# Patient Record
Sex: MALE | Race: WHITE | NOT HISPANIC OR LATINO | URBAN - METROPOLITAN AREA
[De-identification: names, ages, dates, MRNs, and addresses within clinical notes are randomized per-mention and may not be internally consistent; named-entity substitution may affect disease eponyms.]

---

## 2019-06-18 ENCOUNTER — OFFICE VISIT (OUTPATIENT)
Dept: URGENT CARE | Facility: CLINIC | Age: 34
End: 2019-06-18
Payer: MEDICAID

## 2019-06-18 VITALS
SYSTOLIC BLOOD PRESSURE: 130 MMHG | HEART RATE: 81 BPM | HEIGHT: 67 IN | OXYGEN SATURATION: 99 % | BODY MASS INDEX: 17.27 KG/M2 | DIASTOLIC BLOOD PRESSURE: 82 MMHG | TEMPERATURE: 98 F | RESPIRATION RATE: 18 BRPM | WEIGHT: 110 LBS

## 2019-06-18 DIAGNOSIS — R20.2 LEFT HAND PARESTHESIA: Primary | ICD-10-CM

## 2019-06-18 PROCEDURE — 99203 OFFICE O/P NEW LOW 30 MIN: CPT | Mod: S$GLB,,, | Performed by: FAMILY MEDICINE

## 2019-06-18 PROCEDURE — 99203 PR OFFICE/OUTPT VISIT, NEW, LEVL III, 30-44 MIN: ICD-10-PCS | Mod: S$GLB,,, | Performed by: FAMILY MEDICINE

## 2019-06-18 NOTE — PATIENT INSTRUCTIONS
"  Paraesthesias  Paraesthesia is a burning or prickling sensation that is sometimes felt in the hands, arms, legs or feet. It can also occur in other parts of the body. It can also feel like tingling or numbness, skin crawling, or itching. The feeling is not comfortable, but it is not painful. (The "pins and needles" feeling that happens when a foot or hand "falls asleep" is a temporary paraesthesia.)  Paraesthesias that last or come and go may be caused by medical issues that need to be treated. These include stroke, a bulging disk pressing on a nerve, a trapped nerve, vitamin deficiencies, or even certain medicines.  Tests are often done. These tests may include blood tests, X-ray, CT (computerized tomography) scan, or a muscle test (electromyography). Depending on the cause, treatment may include physical therapy.  Home care  · Tell the healthcare provider about all medicines you take. This includes prescription and over-the-counter medicines, vitamins, and herbs. Ask if any of the medicines may be causing your problems. Do not make any changes to prescription medicines without talking to your healthcare provider first.  · You may be prescribed medicines to help relieve the tingling feeling or for pain. Take all medicines as directed.  · A numb hand or foot may be more prone to injury. To help protect it:  ¨ Always use oven mitts.  ¨ Test water with an unaffected hand or foot.  ¨ Use caution when trimming nails. File sharp areas.  ¨ Wear shoes that fit well to avoid pressure points, blisters, and ulcers.  ¨ Inspect your hands and feet carefully (including the soles of your feet and between your toes) at least once a week. If you see red areas, sores, or other problems, tell your healthcare provider.  Follow-up care  Follow up with your doctor or as advised by our staff. You may need further testing or evaluation.  When to seek medical advice  Call your healthcare provider right away if any of the following " occur:  · Numbness or weakness of the face, one arm, or one leg  · Slurred speech, confusion, trouble speaking, walking, or seeing  · Severe headache, fainting spell, dizziness, or seizure  · Chest, arm, neck, or upper back pain  · Loss of bladder or bowel control  · Open wound with redness, swelling, or pus  Date Last Reviewed: 9/25/2015  © 0308-6788 [employer]. [address] [place] [address], [place] [address]. All rights reserved. This information is not intended as a substitute for professional medical care. Always follow your healthcare professional's instructions.      IF THIS IS NOT IMPROVING, I WANT YOU TO SEE THE HAND DOCTOR.  CALL [phone], IF YOU DO NOT HEAR FROM THEM.    Make sure that you follow up with your primary care doctor in the next 2-5 days if needed .  Return to the Urgent Care if signs or symptoms change and certainly if you have worsening symptoms go to the nearest emergency department for further evaluation.

## 2019-06-18 NOTE — PROGRESS NOTES
"Subjective:       Patient ID: Alexandru Najera is a 34 y.o. male.    Vitals:  height is 5' 7" (1.702 m) and weight is 49.9 kg (110 lb). His oral temperature is 97.8 °F (36.6 °C). His blood pressure is 130/82 and his pulse is 81. His respiration is 18 and oxygen saturation is 99%.     Chief Complaint: Hand Pain    34-year-old male who works as a .  About 1 week ago he noticed a tiny bump on his left little finger, and did not think much of it.  These past several days he has felt a pins and needle sensation along his lateral left pinky, and this same sensation has extended up his lateral arm, at times to his elbow.  Today he has also noticed some pins and needle sensation to his left palm, and a faint sense of tightness in his left hand.  His symptoms are not worse at night.  He has never experienced anything similar in the past.  He denies any hand weakness.  No history of trauma.  No recent headaches or rash.    Hand Pain    The incident occurred 5 to 7 days ago. The incident occurred at home. There was no injury mechanism. The pain is present in the left hand. The quality of the pain is described as aching and cramping. The pain radiates to the left arm. The pain is at a severity of 5/10. The pain is mild. The pain has been constant since the incident. Associated symptoms include muscle weakness, numbness and tingling. Pertinent negatives include no chest pain. The symptoms are aggravated by movement. He has tried acetaminophen for the symptoms. The treatment provided mild relief.       Constitution: Negative for chills, fatigue and fever.   HENT: Negative for congestion and sore throat.    Neck: Negative for painful lymph nodes.   Cardiovascular: Negative for chest pain and leg swelling.   Eyes: Negative for double vision and blurred vision.   Respiratory: Negative for cough and shortness of breath.    Gastrointestinal: Negative for nausea, vomiting and diarrhea.   Genitourinary: Negative for dysuria, " frequency and urgency.   Musculoskeletal: Positive for muscle cramps and muscle ache. Negative for trauma and joint swelling.   Skin: Negative for color change, pale and rash.   Allergic/Immunologic: Negative for seasonal allergies.   Neurological: Positive for numbness and tingling. Negative for dizziness, history of vertigo, light-headedness, passing out and headaches.   Hematologic/Lymphatic: Negative for swollen lymph nodes, easy bruising/bleeding and history of blood clots. Does not bruise/bleed easily.   Psychiatric/Behavioral: Negative for nervous/anxious, sleep disturbance and depression. The patient is not nervous/anxious.        Objective:      Physical Exam   Constitutional: He is oriented to person, place, and time. He appears well-developed and well-nourished. He is cooperative.  Non-toxic appearance. He does not appear ill. No distress.   HENT:   Head: Normocephalic and atraumatic.   Right Ear: Hearing, tympanic membrane and ear canal normal.   Left Ear: Hearing, tympanic membrane and ear canal normal.   Nose: Nose normal. No mucosal edema, rhinorrhea or nasal deformity. No epistaxis. Right sinus exhibits no maxillary sinus tenderness and no frontal sinus tenderness. Left sinus exhibits no maxillary sinus tenderness and no frontal sinus tenderness.   Mouth/Throat: Uvula is midline and mucous membranes are normal. No trismus in the jaw. Normal dentition. No uvula swelling. No posterior oropharyngeal erythema.   Eyes: Conjunctivae and lids are normal. Right eye exhibits no discharge. Left eye exhibits no discharge. No scleral icterus.   Sclera clear bilat   Neck: Trachea normal, normal range of motion, full passive range of motion without pain and phonation normal. Neck supple.   Cardiovascular: Normal rate, regular rhythm, normal heart sounds, intact distal pulses and normal pulses.   No murmur heard.  Pulmonary/Chest: Effort normal and breath sounds normal. No stridor. No respiratory distress. He has  no wheezes. He has no rales.   Abdominal: Normal appearance. He exhibits no pulsatile midline mass.   Musculoskeletal: Normal range of motion. He exhibits no edema, tenderness or deformity.   There is a tiny lesion to the palmar aspect of his left 5th middle phalanx consistent with a small callus or perhaps early warty lesion.  This is what he noted initially. Equal  are noted bilaterally. No rash or erythema or swelling. No limited range of motion of any joints.  He demonstrates full range of motion of all joints.  Negative Tinel's.  Negative Phalen's.  A decreased sensation is noted to his lateral left forearm border and to his left 5th finger and palm   Neurological: He is alert and oriented to person, place, and time. He displays normal reflexes. He exhibits normal muscle tone. Coordination normal.   Skin: Skin is warm, dry and intact. He is not diaphoretic. No pallor.   Psychiatric: He has a normal mood and affect. His speech is normal and behavior is normal. Judgment and thought content normal. Cognition and memory are normal.   Nursing note and vitals reviewed.      Assessment:       1. Left hand paresthesia        Plan:         Left hand paresthesia  -     Ambulatory referral to Hand Surgery    IF THIS IS NOT IMPROVING, I WANT YOU TO SEE THE HAND DOCTOR.  CALL [phone], IF YOU DO NOT HEAR FROM THEM.    Make sure that you follow up with your primary care doctor in the next 2-5 days if needed .  Return to the Urgent Care if signs or symptoms change and certainly if you have worsening symptoms go to the nearest emergency department for further evaluation.

## 2020-11-18 ENCOUNTER — TELEPHONE (OUTPATIENT)
Dept: UROLOGY | Facility: CLINIC | Age: 35
End: 2020-11-18

## 2020-11-18 NOTE — TELEPHONE ENCOUNTER
----- Message from Cindy Hodgson MA sent at 11/17/2020 11:11 AM CST -----    ----- Message -----  From: Yuri Jennings  Sent: 11/17/2020   9:22 AM CST  To: [facility] Staff        Name of Who is Calling: SHANICE LEAL [[id number]]      What is the request in detail: Pt was referred by Dr. Wilbur Isaac to be seen pt has Medicaid.Please contact to further discuss and advise.          Can the clinic reply by [facility]: N      What Number to Call Back if not [facility]: [phone]

## 2020-12-17 ENCOUNTER — OFFICE VISIT (OUTPATIENT)
Dept: UROLOGY | Facility: CLINIC | Age: 35
End: 2020-12-17
Attending: UROLOGY
Payer: MEDICAID

## 2020-12-17 VITALS
DIASTOLIC BLOOD PRESSURE: 86 MMHG | HEART RATE: 89 BPM | HEIGHT: 69 IN | WEIGHT: 118 LBS | SYSTOLIC BLOOD PRESSURE: 146 MMHG | BODY MASS INDEX: 17.48 KG/M2

## 2020-12-17 DIAGNOSIS — N40.0 ENLARGED PROSTATE: Primary | ICD-10-CM

## 2020-12-17 LAB
BILIRUB SERPL-MCNC: ABNORMAL MG/DL
BLOOD URINE, POC: ABNORMAL
CLARITY, POC UA: CLEAR
COLOR, POC UA: YELLOW
GLUCOSE UR QL STRIP: NORMAL
KETONES UR QL STRIP: ABNORMAL
LEUKOCYTE ESTERASE URINE, POC: ABNORMAL
NITRITE, POC UA: ABNORMAL
PH, POC UA: 5
PROTEIN, POC: ABNORMAL
SPECIFIC GRAVITY, POC UA: 1.01
UROBILINOGEN, POC UA: NORMAL

## 2020-12-17 PROCEDURE — 99204 OFFICE O/P NEW MOD 45 MIN: CPT | Mod: 25,S$GLB,, | Performed by: UROLOGY

## 2020-12-17 PROCEDURE — 81002 POCT URINE DIPSTICK WITHOUT MICROSCOPE: ICD-10-PCS | Mod: S$GLB,,, | Performed by: UROLOGY

## 2020-12-17 PROCEDURE — 87086 URINE CULTURE/COLONY COUNT: CPT

## 2020-12-17 PROCEDURE — 99204 PR OFFICE/OUTPT VISIT, NEW, LEVL IV, 45-59 MIN: ICD-10-PCS | Mod: 25,S$GLB,, | Performed by: UROLOGY

## 2020-12-17 PROCEDURE — 81002 URINALYSIS NONAUTO W/O SCOPE: CPT | Mod: S$GLB,,, | Performed by: UROLOGY

## 2020-12-17 NOTE — LETTER
December 17, 2020      Wilbur Isaac Jr., MD  [address]  [address]  [facility] [address]           [facility]  [address]  [facility] [address]  Phone: [phone]  Fax: [phone]          Patient: Alexandru Najera   MR Number: [id number]   YOB: 1985   Date of Visit: 12/17/2020       Dear Dr. Wilbur Isaac Jr.:    Thank you for referring Alexandru Najera to me for evaluation. Attached you will find relevant portions of my assessment and plan of care.    If you have questions, please do not hesitate to call me. I look forward to following Alexandru Najera along with you.    Sincerely,    Refugio Cruz MD    Enclosure  CC:  No Recipients    If you would like to receive this communication electronically, please contact [email or web address] or [phone] to request more information on [employer] access.    For providers and/or their staff who would like to refer a patient to [facility], please contact us through our one-stop-shop provider referral line, [facility], at [phone].    If you feel you have received this communication in error or would no longer like to receive these types of communications, please e-mail [email or web address]

## 2020-12-17 NOTE — PROGRESS NOTES
"Subjective:      Alexandru Najera is a 35 y.o. male who was referred by Wilbur Isaac Jr., MD for evaluation of abdominal pain.      He has a couple of months of lower abdominal/suprapubic pain and bloating. His pain is more severe when he has been sitting for long periods. He has some baseline urinary frequency and urgency, but no changes recently. He denies any pelvic or perineal pain.  He has had a course of cipro and one of bactrim without symptom relief. He denies dysuria, fever, and hematuria.    The following portions of the patient's history were reviewed and updated as appropriate: allergies, current medications, past family history, past medical history, past social history, past surgical history and problem list.    Review of Systems  Constitutional: no fever or chills  ENT: no nasal congestion or sore throat  Respiratory: no cough or shortness of breath  Cardiovascular: no chest pain or palpitations  Gastrointestinal: no nausea or vomiting, tolerating diet  Genitourinary: as per HPI  Hematologic/Lymphatic: no easy bruising or lymphadenopathy  Musculoskeletal: no arthralgias or myalgias  Neurological: no seizures or tremors  Behavioral/Psych: no auditory or visual hallucinations     Objective:   Vitals: BP (!) 146/86 (BP Location: Right arm, Patient Position: Sitting, BP Method: Large (Automatic))   Pulse 89   Ht 5' 9" (1.753 m)   Wt 53.5 kg (118 lb)   BMI 17.43 kg/m²     Physical Exam   General: alert and oriented, no acute distress  Head: normocephalic, atraumatic  Neck: supple, no lymphadenopathy, normal ROM, no masses  Respiratory: Symmetric expansion, non-labored breathing  Cardiovascular: regular rate and rhythm, nomal pulses, no peripheral edema  Abdomen: soft, non tender, very mildly distended, no palpable masses, no hernias, no hepatomegaly or splenomegaly  Genitourinary:   Penis: normal, no lesions, patent orthotopic meatus, no plaques  Scrotum: no rashes or skin changes;   Skin: normal " coloration and turgor, no rashes, no suspicious skin lesions noted  Neuro: alert and oriented x3, no gross deficits  Psych: normal judgment and insight, normal mood/affect and non-anxious    Bladder Scan PVR: 15cc      Lab Review   Urinalysis demonstrates negative for all components    Imaging   CT A/P (report from [facility] reviewed): Enlarged prostate noted,  findings o/w normal    Assessment:     1. Enlarged prostate        Plan:   1. Enlarged prostate on CT without associated urinary symptoms has very little clinical significance.  2. There is no indication that pain is from prostatitis or other  etiology, especially given location, associated bloating, and failure to respond to appropriate treatment for prostatitis.  3. Continue to FU w/ GI

## 2020-12-18 LAB — BACTERIA UR CULT: NO GROWTH

## 2022-01-19 ENCOUNTER — CLINICAL SUPPORT (OUTPATIENT)
Dept: REHABILITATION | Facility: OTHER | Age: 37
End: 2022-01-19
Payer: MEDICAID

## 2022-01-19 DIAGNOSIS — R19.8 ABDOMINAL WEAKNESS: ICD-10-CM

## 2022-01-19 DIAGNOSIS — M54.16 LUMBAR RADICULOPATHY: ICD-10-CM

## 2022-01-19 PROCEDURE — 97161 PT EVAL LOW COMPLEX 20 MIN: CPT | Mod: PN

## 2022-01-19 PROCEDURE — 97110 THERAPEUTIC EXERCISES: CPT | Mod: PN

## 2022-01-19 NOTE — PLAN OF CARE
[facility]  Physical Therapy Initial Evaluation    Name: Alexandru Najera  Clinic Number: [id number]    Therapy Diagnosis:   Encounter Diagnoses   Name Primary?    Lumbar radiculopathy     Abdominal weakness      Physician: Александр Quintanilla*    Physician Orders: Evaluate and Treat  Medical Diagnosis from Referral: Radiculopathy, lumbar region [M54.16]    Evaluation Date: 1/19/2022  Authorization Period Expiration: 1/31/22  Plan of Care Expiration: 1/19/2022 to 4/19/22  Visit # / Visits authorized: 1/1 (pending additional authorization following initial evaluation)     Time In: 1000am  Time Out: 1100am  Total Billable Time: 60 minutes    Precautions: Standard    Subjective     Date of onset: one year prior    History of current condition - Alexandru reports that he has experienced low back pain for one year after doing a lot of overhead/heavy lifting to move. Pt states that he is now having abdominal pain and low back pain. Pt is unable to lift anything over 10 pounds before experiencing abdominal and low back pain. Pt states that he taking Meloxocam and gabapentin to decrease his low back pain. Pt states that this has dramatically improved his symptoms and he is now able to sleep. Pt denies pain with coughing/sneezing. Pt reports that he has good days and bad days and his symptoms fluctuate. Pt is only able to stand for one hour before experiencing low back pain. Pt is able to walk as far as he would like. Pt states that sitting statically and sleeping are the most painful and create abdominal pain. Pt worked as a  at a restaurant for 5 years and is not unemployed at this time due to this injury. Pt reports that he has undergone several procedures (endoscope, ultrasound, CT Scan) to decipher the source of his abdominal pain. However, none of these procedures have revealed the cause of his abdominal pain. Pt states that his physician would like for him to undergo PT tx so that he  may strengthen his abdominal muscles and decrease his low back pain.   Medical History:   Past Medical History:   Diagnosis Date    Acne        Surgical History:   Alexandru Najera  has no past surgical history on file.    Medications:   Alexandru has a current medication list which includes the following prescription(s): sulfamethoxazole-trimethoprim 800-160mg.    Allergies:   Review of patient's allergies indicates:  No Known Allergies     Imaging: Unavailable    Prior Therapy: None  Social History: Pt lives with his two brothers that assist him as needed  Occupation: Currently unemployed; Would like to find a work from home job in the near future  Prior Level of Function: No limitations  Current Level of Function: Pt only able to tolerate sitting for 30 minutes 2/2 abdominal pain    Pain:  Current 0/10, worst 9/10, best 0/10   Location: Central abdomen slightly 1 inch right of the belly button   Description: Sharp  Aggravating Factors: Sitting and sleeping  Easing Factors: changing positions    Pts goals: Pt would like to return to sitting with no increase in abdominal pain.    Objective     WNL=within normal limits  WFL=within functional limits  NT=not tested  !=pain    Posture: Forward head, rounded shoulders. Pt very slender/underweight.  Palpation: TTP at bellybutton- no pulsing noted upon palpation  Sensation: Intact  Deep tendon reflexes: Intact    Lumbar Active range of motion  Pain/dysfunction with movement:   Flexion WNL painful   Extension WNL Provokes abdominal symptoms   Right side bending WNL painful   Left side bending WNL    Right rotation WNL painful   Left rotation WNL             Lower extremity manual muscle tests  Right Left   Hip flexion 4/5 4/5   Hip extension 4+/5 5/5   Hip abduction 3+/5 3+/5   Hip adduction 4/5 4+/5   Hip internal rotation 4/5 4+/5   Hip external rotation 4/5 4/5   Knee flexion 4+/5 4/5   Knee extension 4/5 4+/5   Ankle dorsiflexion 5/5 5/5   Ankle plantarflexion 5/5 5/5    Ankle inversion 5/5 5/5   Ankle eversion 5/5 5/5     SLR neural tension: (-) bilaterally    Plan to assess McBurney's point for potential appendicitis next visit.      CMS Impairment/Limitation/Restriction for FOTO Lumbar Survey    Therapist reviewed FOTO scores for Alexandru Najera on 1/19/2022.   FOTO documents entered into [employer] - see Media section.    Limitation Score: 54%  Predicted Goal: 48%    Category: Mobility     TREATMENT     Treatment Time In: 1000am  Treatment Time Out: 1100am  Total Treatment time separate from Evaluation: 15 minutes    Therapeutic Exercises were provided for 15 minutes to improve strength and AROM including:  Supine TrA contraction with 5 second hold 20 repetitions  Pt discussed proper lifting mechanics at length with pt today. Pt verbalized understanding. Plan to demonstrate at next tx session.         Home Exercises and Patient Education Provided:    Education provided:   - Findings; prognosis and plan of care (POC)  - Home exercise program (HEP)  - Modality options  - Therapist contact information    Written Home Exercises Provided: yes.  Exercises were reviewed and Alexandru was able to demonstrate them prior to the end of the session.  Alexandru demonstrated good  understanding of the education provided.     See EMR under Patient Instructions for exercises provided 1/19/2022.    Assessment     Alexandru is a 36 y.o. male referred to outpatient Physical Therapy with a medical diagnosis of Radiculopathy, lumbar region [M54.16]  . Pt presents to PT with pain, decreased lumbar ROM, decreased strength and flexibility, poor posture, and functional deficits with standing/walking. These deficits are negatively impacting this patient's ability to complete their work duties and activities of daily living. Pt's abdominal pain does not follow a specific musculoskeletal pattern at this time. Plan to address posture and strengthening to make pt as comfortable as possible so that he may return to working and  ADL's.     Pt prognosis is Fair.   Pt will benefit from skilled outpatient Physical Therapy to address the deficits stated above and in the chart below, provide pt/family education, and to maximize pt's level of independence.     Plan of care discussed with patient: Yes  Pt's spiritual, cultural and educational needs considered and pt agreeable to plan of care and goals as stated below:     Anticipated Barriers for therapy: None    Medical Necessity is demonstrated by the following  History  Co-morbidities and personal factors that may impact the plan of care Co-morbidities:   young age    Personal Factors:   age     low   Examination  Body Structures and Functions, activity limitations and participation restrictions that may impact the plan of care Body Regions:   back    Body Systems:    gross symmetry  ROM  strength  gross coordinated movement    Participation Restrictions:   Walking    Activity limitations:   Learning and applying knowledge  no deficits    General Tasks and Commands  no deficits    Communication  no deficits    Mobility  no deficits    Self care  no deficits    Domestic Life  no deficits    Interactions/Relationships  no deficits    Life Areas  no deficits    Community and Social Life  no deficits         low   Clinical Presentation stable and uncomplicated low   Decision Making/ Complexity Score: low     GOALS:  Short Term Goals (4 Weeks):  1. Patient will be compliant with home exercise program to supplement therapy in promoting functional mobility. (progressing, not met)    2. Patient will perform deadlift with good control to demonstrate improved core strength. (progressing, not met)    3. Patient will report no pain during thoracolumbar active range of motion to promote functional mobility.  (progressing, not met)    4. Patient will improve impaired lower extremity hip manual muscle tests  to >/=4/5 to improve strength for functional tasks. (progressing, not met)        Long Term Goals (6  Weeks):   1. Patient will improve FOTO score to </= 48% limited to decrease perceived limitation with maintaining/changing body position. (progressing, not met)    2. Patient will perform dying bug exercise with good control to demonstrate improved core strength.  (progressing, not met)    3. Patient will improve impaired lower extremity hip manual muscle tests to >/=4+/5 to improve strength for functional tasks.  (progressing, not met)    4. Patient will tolerate sitting for 30 minutes with no increase in low back pain to return to PLOF.  (progressing, not met)        Plan     Plan of care Certification: 1/19/2022 to 4/19/22     Outpatient Physical Therapy 2 times weekly for 4 weeks to include the following interventions: Therapeutic Exercises, Manual Therapeutic Technique, Neuromuscular Re Education, Therapeutic Activities. Modalities, Kinesiotape prn, and Functional Dry Needling as needed.    Lupe Hoffman, PT,  DPT, OCS

## 2022-02-01 ENCOUNTER — CLINICAL SUPPORT (OUTPATIENT)
Dept: REHABILITATION | Facility: OTHER | Age: 37
End: 2022-02-01
Payer: MEDICAID

## 2022-02-01 DIAGNOSIS — R19.8 ABDOMINAL WEAKNESS: ICD-10-CM

## 2022-02-01 DIAGNOSIS — M54.16 LUMBAR RADICULOPATHY: Primary | ICD-10-CM

## 2022-02-01 PROCEDURE — 97140 MANUAL THERAPY 1/> REGIONS: CPT | Mod: PN

## 2022-02-01 PROCEDURE — 97110 THERAPEUTIC EXERCISES: CPT | Mod: PN

## 2022-02-01 NOTE — PROGRESS NOTES
[facility]   Physical Therapy Treatment Note     Name: Alexandru Najera  Clinic Number: [id number]    Therapy Diagnosis:   Encounter Diagnoses   Name Primary?    Lumbar radiculopathy Yes    Abdominal weakness      Physician: Александр Quintanilla    Visit Date: 2/1/2022    Physician Orders: Evaluate and Treat  Medical Diagnosis from Referral: Radiculopathy, lumbar region [M54.16]     Evaluation Date: 1/19/2022  Authorization Period Expiration: 1/31/22  Plan of Care Expiration: 1/19/2022 to 4/19/22  Visit # / Visits authorized: 1/1 (pending additional authorization following initial evaluation)   Precautions: Standard    Time In: 09:01am  Time Out: 10:14am  Total Billable Time: 73 minutes    SUBJECTIVE     Occupation: Currently unemployed; Would like to find a work from home job in the near future  Prior Level of Function: No limitations  Current Level of Function: Pt only able to tolerate sitting for 30 minutes 2/2 abdominal pain     Pain:  Current: 2/10  Location: umbilical region   Description: Sharp     Pts goals: Pt would like to return to sitting with no increase in abdominal pain.    He was not compliant with home exercise program.  Response to previous treatment: pain at umbilical region persists, is intermittent, worse with movement getting out of bed and standing while using arms against resistance   Function: unable to exercise and decreased ADL tolerance     OBJECTIVE   1/19/2022:  Posture: Forward head, rounded shoulders. Pt very slender/underweight.  Palpation: TTP at bellybutton- no pulsing noted upon palpation  Sensation: Intact  Deep tendon reflexes: Intact     Lumbar Active range of motion  Pain/dysfunction with movement:   Flexion WNL painful   Extension WNL Provokes abdominal symptoms   Right side bending WNL painful   Left side bending WNL     Right rotation WNL painful   Left rotation WNL        Lower extremity manual muscle tests  Right Left   Hip flexion 4/5 4/5  "  Hip extension 4+/5 5/5   Hip abduction 3+/5 3+/5   Hip adduction 4/5 4+/5   Hip internal rotation 4/5 4+/5   Hip external rotation 4/5 4/5   Knee flexion 4+/5 4/5   Knee extension 4/5 4+/5   Ankle dorsiflexion 5/5 5/5   Ankle plantarflexion 5/5 5/5   Ankle inversion 5/5 5/5   Ankle eversion 5/5 5/5      SLR neural tension: (-) bilaterally     Plan to assess McBurney's point for potential appendicitis next visit.    CMS Impairment/Limitation/Restriction for FOTO Lumbar Survey     Therapist reviewed FOTO scores for Alexandru Najera on 1/19/2022.   FOTO documents entered into [employer] - see Media section.     Limitation Score: 54%  Predicted Goal: 48%     Category: Mobility     Treatment   Charges based on 1-1 tx:   therapeutic exercises for 49 minutes:   Prone on elbows, 10"x10  Seated thoracic ext with OP, 10"x10  R/L SL open book stretch   Bridging, 2x10, 3" hold  Standing repeated extension, 10"X10  Written HOME EXERCISE PROGRAM updated, reviewed, patient demo understanding   Patient education on nature of current condition and PHYSICAL THERAPY POC     manual therapy techniques for 24 minutes:   R/L lumbar rotation +SB JM in SL, Gr III/IV   Seated thoracic side bending, rotation and extension JMs R/L Gr III/IV J M    neuromuscular re-education for minutes:     therapeutic activities for minutes:     Patient Education and Home Exercises     Home Exercises Provided and Patient Education Provided     Education provided:   - yes    Written Home Exercises Provided: yes. Exercises were reviewed and Alexandru was able to demonstrate them prior to the end of the session.  Alexandru demonstrated good  understanding of the education provided. See EMR under Patient Instructions for exercises provided during therapy sessions    ASSESSMENT     Unable to tolerate prone lying on second attempt in order to participate in manual thoracic intervention but patient can tolerate thoracic JM in sitting and SL. L lower thoracic region stiffer than R " during JM. Umbilical pain varies slightly (2/10 - 4/10) in intensity throughout session today. Written HOME EXERCISE PROGRAM updated, reviewed, patient demo understanding.     Alexandru is making fair progress towards meeting set goals.   Pt prognosis is Good.     Pt will continue to benefit from skilled outpatient physical therapy to address the deficits listed in the problem list box on initial evaluation, provide pt/family education and to maximize pt's level of independence in the home and community environment.     Pt's spiritual, cultural and educational needs considered and pt agreeable to plan of care and goals.     Anticipated barriers to physical therapy: none    GOALS:  Short Term Goals (4 Weeks):  1. Patient will be compliant with home exercise program to supplement therapy in promoting functional mobility. (progressing, not met)    2. Patient will perform deadlift with good control to demonstrate improved core strength. (progressing, not met)    3. Patient will report no pain during thoracolumbar active range of motion to promote functional mobility.  (progressing, not met)    4. Patient will improve impaired lower extremity hip manual muscle tests  to >/=4/5 to improve strength for functional tasks. (progressing, not met)       Long Term Goals (6 Weeks):   1. Patient will improve FOTO score to </= 48% limited to decrease perceived limitation with maintaining/changing body position. (progressing, not met)    2. Patient will perform dying bug exercise with good control to demonstrate improved core strength.  (progressing, not met)    3. Patient will improve impaired lower extremity hip manual muscle tests to >/=4+/5 to improve strength for functional tasks.  (progressing, not met)    4. Patient will tolerate sitting for 30 minutes with no increase in low back pain to return to PLOF.  (progressing, not met)      PLAN     Continue with PHYSICAL THERAPY addressing spinal mobility, core strengthening      Mindy Gill, PT

## 2022-02-03 ENCOUNTER — DOCUMENTATION ONLY (OUTPATIENT)
Dept: REHABILITATION | Facility: OTHER | Age: 37
End: 2022-02-03

## 2022-02-03 ENCOUNTER — CLINICAL SUPPORT (OUTPATIENT)
Dept: REHABILITATION | Facility: OTHER | Age: 37
End: 2022-02-03
Payer: MEDICAID

## 2022-02-03 DIAGNOSIS — R19.8 ABDOMINAL WEAKNESS: ICD-10-CM

## 2022-02-03 DIAGNOSIS — M54.16 LUMBAR RADICULOPATHY: ICD-10-CM

## 2022-02-03 PROCEDURE — 97110 THERAPEUTIC EXERCISES: CPT | Mod: PN,CQ

## 2022-02-03 NOTE — PROGRESS NOTES
[facility]ERAP[facility] AND [facility]   Physical Therapy Treatment Note     Name: Alexandru Najera  Clinic Number: [id number]    Therapy Diagnosis:   Encounter Diagnoses   Name Primary?    Lumbar radiculopathy     Abdominal weakness      Physician: Александр Quintanilla    Visit Date: 2/3/2022    Physician Orders: Evaluate and Treat  Medical Diagnosis from Referral: Radiculopathy, lumbar region [M54.16]     Evaluation Date: 1/19/2022  Authorization Period Expiration: 1/31/22  Plan of Care Expiration: 1/19/2022 to 4/19/22  Visit # / Visits authorized: 3/1 (pending additional authorization following initial evaluation)   Precautions: Standard    Time In: 0950  Time Out: 1053  Total Billable Time: 60 minutes    SUBJECTIVE   Still feeling the abdominal pressure/discomfort. States lying on his stomach hurts and he has a hard time performing his HEP due to pain.    Occupation: Currently unemployed; Would like to find a work from home job in the near future  Prior Level of Function: No limitations  Current Level of Function: Pt only able to tolerate sitting for 30 minutes 2/2 abdominal pain     Pain:  Current: 2/10   Location: umbilical region   Description: Sharp     Pts goals: Pt would like to return to sitting with no increase in abdominal pain.    He was not compliant with home exercise program.  Response to previous treatment: pain at umbilical region persists, is intermittent, worse with movement getting out of bed and standing while using arms against resistance   Function: unable to exercise and decreased ADL tolerance     OBJECTIVE   1/19/2022:  Posture: Forward head, rounded shoulders. Pt very slender/underweight.  Palpation: TTP at bellybutton- no pulsing noted upon palpation  Sensation: Intact  Deep tendon reflexes: Intact     Lumbar Active range of motion  Pain/dysfunction with movement:   Flexion WNL painful   Extension WNL Provokes abdominal symptoms   Right side bending WNL painful   Left side bending WNL   "   Right rotation WNL painful   Left rotation WNL        Lower extremity manual muscle tests  Right Left   Hip flexion 4/5 4/5   Hip extension 4+/5 5/5   Hip abduction 3+/5 3+/5   Hip adduction 4/5 4+/5   Hip internal rotation 4/5 4+/5   Hip external rotation 4/5 4/5   Knee flexion 4+/5 4/5   Knee extension 4/5 4+/5   Ankle dorsiflexion 5/5 5/5   Ankle plantarflexion 5/5 5/5   Ankle inversion 5/5 5/5   Ankle eversion 5/5 5/5      SLR neural tension: (-) bilaterally     Plan to assess McBurney's point for potential appendicitis next visit.    CMS Impairment/Limitation/Restriction for FOTO Lumbar Survey     Therapist reviewed FOTO scores for Alexandru Najera on 1/19/2022.   FOTO documents entered into [employer] - see Media section.     Limitation Score: 54%  Predicted Goal: 48%     Category: Mobility     Treatment   Charges based on 1-1 tx:   therapeutic exercises for 35 minutes:     Prone on elbows, 10"x10  Seated thoracic ext with OP, 10"x10  R/L SL open book stretch 10x5"  Bridging, 2x10, 3" hold   Standing repeated extension, 10"X10  +Abdominal bracing with red ball 5" hold 20x  +Ball squeezes 5" hold 30x  +Hooklying BKFO YTB 30x  +LTR on red ball 5" hold ea x2'          manual therapy techniques for 00 minutes:   R/L lumbar rotation +SB JM in SL, Gr III/IV   Seated thoracic side bending, rotation and extension JMs R/L Gr III/IV J M    neuromuscular re-education for 25 minutes:     +TrA 5"x20   +TrA + marching 2x10   +Anti rotations with TrA 2x10 YTB  +Shld pulldowns with TrA YTB 2x10  +Wall pushups with abdominal bracing 2x10      therapeutic activities for minutes:     Patient Education and Home Exercises     Home Exercises Provided and Patient Education Provided     Education provided:   - avoiding valsalva with exercises     Written Home Exercises Provided: Patient instructed to cont prior HEP. Exercises were reviewed and Alexandru was able to demonstrate them prior to the end of the session.  Alexandru demonstrated good  " understanding of the education provided. See EMR under Patient Instructions for exercises provided during therapy sessions    ASSESSMENT     Pt tolerated exercise fair this visit. Pt demonstrated core/abdominal weakness with muscle quivering/fatigue present during TrA/bracing exercises. Pt reported pain was localized to anterior aspect of the rectus abdominis just distal to the naval. Unable to tolerate SL trunk rotational/open books secondary to increased pain today. Pt reports difficulty with HEP secondary to increased  intra-abdominal pain with prone/SL exercises. Pt education regarding avoiding valsalva with exercises /ADLs and attempt to perform exercises that do no increase his pain beyond min levels. Pt verbally expressed understanding.       Aleaxndru is making fair progress towards meeting set goals.   Pt prognosis is Good.     Pt will continue to benefit from skilled outpatient physical therapy to address the deficits listed in the problem list box on initial evaluation, provide pt/family education and to maximize pt's level of independence in the home and community environment.     Pt's spiritual, cultural and educational needs considered and pt agreeable to plan of care and goals.     Anticipated barriers to physical therapy: none    GOALS:  Short Term Goals (4 Weeks):  1. Patient will be compliant with home exercise program to supplement therapy in promoting functional mobility. (progressing, not met)    2. Patient will perform deadlift with good control to demonstrate improved core strength. (progressing, not met)    3. Patient will report no pain during thoracolumbar active range of motion to promote functional mobility.  (progressing, not met)    4. Patient will improve impaired lower extremity hip manual muscle tests  to >/=4/5 to improve strength for functional tasks. (progressing, not met)       Long Term Goals (6 Weeks):   1. Patient will improve FOTO score to </= 48% limited to decrease perceived  limitation with maintaining/changing body position. (progressing, not met)    2. Patient will perform dying bug exercise with good control to demonstrate improved core strength.  (progressing, not met)    3. Patient will improve impaired lower extremity hip manual muscle tests to >/=4+/5 to improve strength for functional tasks.  (progressing, not met)    4. Patient will tolerate sitting for 30 minutes with no increase in low back pain to return to PLOF.  (progressing, not met)      PLAN     Continue with PHYSICAL THERAPY addressing spinal mobility, core strengthening     Oliver Shaver, PTA

## 2022-02-03 NOTE — PROGRESS NOTES
Physical Therapist and Physical Therapist Assistant met face to face to discuss patient's treatment plan and progress towards established goals. Pt will be seen by a physical therapist minimally every 6th visit or every 30 days.    Oliver Shaver, PTA  2/3/2022

## 2022-02-07 ENCOUNTER — CLINICAL SUPPORT (OUTPATIENT)
Dept: REHABILITATION | Facility: OTHER | Age: 37
End: 2022-02-07
Payer: MEDICAID

## 2022-02-07 DIAGNOSIS — R19.8 ABDOMINAL WEAKNESS: ICD-10-CM

## 2022-02-07 DIAGNOSIS — M54.16 LUMBAR RADICULOPATHY: ICD-10-CM

## 2022-02-07 PROCEDURE — 97110 THERAPEUTIC EXERCISES: CPT | Mod: PN,CQ

## 2022-02-07 NOTE — PROGRESS NOTES
[facility]   Physical Therapy Treatment Note     Name: Alexandru Najera  Clinic Number: [id number]    Therapy Diagnosis:   Encounter Diagnoses   Name Primary?    Lumbar radiculopathy     Abdominal weakness      Physician: Александр Quintanilla*    Visit Date: 2/7/2022    Physician Orders: Evaluate and Treat  Medical Diagnosis from Referral: Radiculopathy, lumbar region [M54.16]     Evaluation Date: 1/19/2022  Authorization Period Expiration: 1/31/22  Plan of Care Expiration: 1/19/2022 to 4/19/22  Visit # / Visits authorized: 4/1 (pending additional authorization following initial evaluation)   Precautions: Standard     Time In: 0900  Time Out: 1000  Total Billable Time: 60 minutes    SUBJECTIVE   Still feeling the abdominal pressure/discomfort however not as bad today. States he feels like he is making a little progress, as he has not had a lot of the really bad abdominal pain.        Occupation: Currently unemployed; Would like to find a work from home job in the near future  Prior Level of Function: No limitations  Current Level of Function: Pt only able to tolerate sitting for 30 minutes 2/2 abdominal pain     Pain:  Current: 3-4/10    Location: umbilical region   Description: Sharp     Pts goals: Pt would like to return to sitting with no increase in abdominal pain.    He was not compliant with home exercise program.  Response to previous treatment: felt fine, no additional soreness.   Function: unable to exercise and decreased ADL tolerance     OBJECTIVE   1/19/2022:  Posture: Forward head, rounded shoulders. Pt very slender/underweight.  Palpation: TTP at bellybutton- no pulsing noted upon palpation  Sensation: Intact  Deep tendon reflexes: Intact     Lumbar Active range of motion  Pain/dysfunction with movement:   Flexion WNL painful   Extension WNL Provokes abdominal symptoms   Right side bending WNL painful   Left side bending WNL     Right rotation WNL painful   Left rotation WNL  "       Lower extremity manual muscle tests  Right Left   Hip flexion 4/5 4/5   Hip extension 4+/5 5/5   Hip abduction 3+/5 3+/5   Hip adduction 4/5 4+/5   Hip internal rotation 4/5 4+/5   Hip external rotation 4/5 4/5   Knee flexion 4+/5 4/5   Knee extension 4/5 4+/5   Ankle dorsiflexion 5/5 5/5   Ankle plantarflexion 5/5 5/5   Ankle inversion 5/5 5/5   Ankle eversion 5/5 5/5      SLR neural tension: (-) bilaterally     Plan to assess McBurney's point for potential appendicitis next visit.    CMS Impairment/Limitation/Restriction for FOTO Lumbar Survey     Therapist reviewed FOTO scores for Alexandru Najera on 1/19/2022.   FOTO documents entered into [employer] - see Media section.     Limitation Score: 54%  Predicted Goal: 48%     Category: Mobility     Treatment   Charges based on 1-1 tx:   therapeutic exercises for 35 minutes:     Prone on elbows, 10"x10  Standing repeated extension, 5"X10  Seated thoracic ext with OP, 10"x10  R/L SL open book stretch 10x5"  Bridging, 2x10, 3" hold   Abdominal bracing with red ball 5" hold 20x  Ball squeezes 5" hold 30x  Hooklying BKFO YTB 30x  LTR on red ball 5" hold ea x2'            manual therapy techniques for 00 minutes:   R/L lumbar rotation +SB JM in SL, Gr III/IV   Seated thoracic side bending, rotation and extension JMs R/L Gr III/IV J M    neuromuscular re-education for 25 minutes:     TrA 5"x20   TrA + marching 2x10   Anti rotations with TrA 2x10 YTB  Shld pulldowns with TrA YTB 2x10  Wall pushups with abdominal bracing 2x10      therapeutic activities for minutes:     Patient Education and Home Exercises     Home Exercises Provided and Patient Education Provided     Education provided:   - TrA activation     Written Home Exercises Provided: Patient instructed to cont prior HEP. Exercises were reviewed and Alexandru was able to demonstrate them prior to the end of the session.  Alexandru demonstrated good  understanding of the education provided. See EMR under Patient Instructions for " exercises provided during therapy sessions    ASSESSMENT     Pt tolerated exercise fair this visit. Continued to report distal abdominal pain with supine/trunk flexion exercises however, pain did not exacerbate beyond stated levels upon entry. Continued to focus on TrA activation and promoting improved abdominal strength/enduance.          Alexandru is making fair progress towards meeting set goals.   Pt prognosis is Good.     Pt will continue to benefit from skilled outpatient physical therapy to address the deficits listed in the problem list box on initial evaluation, provide pt/family education and to maximize pt's level of independence in the home and community environment.     Pt's spiritual, cultural and educational needs considered and pt agreeable to plan of care and goals.     Anticipated barriers to physical therapy: none    GOALS:  Short Term Goals (4 Weeks):  1. Patient will be compliant with home exercise program to supplement therapy in promoting functional mobility. (progressing, not met)    2. Patient will perform deadlift with good control to demonstrate improved core strength. (progressing, not met)    3. Patient will report no pain during thoracolumbar active range of motion to promote functional mobility.  (progressing, not met)    4. Patient will improve impaired lower extremity hip manual muscle tests  to >/=4/5 to improve strength for functional tasks. (progressing, not met)       Long Term Goals (6 Weeks):   1. Patient will improve FOTO score to </= 48% limited to decrease perceived limitation with maintaining/changing body position. (progressing, not met)    2. Patient will perform dying bug exercise with good control to demonstrate improved core strength.  (progressing, not met)    3. Patient will improve impaired lower extremity hip manual muscle tests to >/=4+/5 to improve strength for functional tasks.  (progressing, not met)    4. Patient will tolerate sitting for 30 minutes with no  increase in low back pain to return to PLOF.  (progressing, not met)      PLAN     Continue with PHYSICAL THERAPY addressing spinal mobility, core strengthening     Oliver Shaver, PTA

## 2022-02-11 ENCOUNTER — CLINICAL SUPPORT (OUTPATIENT)
Dept: REHABILITATION | Facility: OTHER | Age: 37
End: 2022-02-11
Payer: MEDICAID

## 2022-02-11 DIAGNOSIS — R19.8 ABDOMINAL WEAKNESS: ICD-10-CM

## 2022-02-11 DIAGNOSIS — M54.16 LUMBAR RADICULOPATHY: Primary | ICD-10-CM

## 2022-02-11 PROCEDURE — 97110 THERAPEUTIC EXERCISES: CPT | Mod: PN

## 2022-02-11 NOTE — PROGRESS NOTES
[facility]   Physical Therapy Treatment Note     Name: Alexandru Najera  Clinic Number: [id number]    Therapy Diagnosis:   Encounter Diagnoses   Name Primary?    Lumbar radiculopathy Yes    Abdominal weakness      Physician: Александр Quintanilla    Visit Date: 2/11/2022    Physician Orders: Evaluate and Treat  Medical Diagnosis from Referral: Radiculopathy, lumbar region [M54.16]     Evaluation Date: 1/19/2022  Authorization Period Expiration: 1/19/23  Plan of Care Expiration: 1/19/2022 to 4/19/22  Visit # / Visits authorized: 5/20    Time In: 0900  Time Out: 1000  Total Billable Time: 60 minutes    SUBJECTIVE   Pt reports that his exercises are getting easier. Pt feels stronger with standing and walking.        Occupation: Currently unemployed; Would like to find a work from home job in the near future  Prior Level of Function: No limitations  Current Level of Function: Pt only able to tolerate sitting for 30 minutes 2/2 abdominal pain     Pain:  Current: 3-4/10    Location: umbilical region   Description: Sharp     Pts goals: Pt would like to return to sitting with no increase in abdominal pain.    He was not compliant with home exercise program.  Response to previous treatment: felt fine, no additional soreness.   Function: unable to exercise and decreased ADL tolerance     OBJECTIVE   1/19/2022:  Posture: Forward head, rounded shoulders. Pt very slender/underweight.  Palpation: TTP at bellybutton- no pulsing noted upon palpation  Sensation: Intact  Deep tendon reflexes: Intact     Lumbar Active range of motion  Pain/dysfunction with movement:   Flexion WNL painful   Extension WNL Provokes abdominal symptoms   Right side bending WNL painful   Left side bending WNL     Right rotation WNL painful   Left rotation WNL        Lower extremity manual muscle tests  Right Left   Hip flexion 4/5 4/5   Hip extension 4+/5 5/5   Hip abduction 3+/5 3+/5   Hip adduction 4/5 4+/5   Hip internal  "rotation 4/5 4+/5   Hip external rotation 4/5 4/5   Knee flexion 4+/5 4/5   Knee extension 4/5 4+/5   Ankle dorsiflexion 5/5 5/5   Ankle plantarflexion 5/5 5/5   Ankle inversion 5/5 5/5   Ankle eversion 5/5 5/5      SLR neural tension: (-) bilaterally     Plan to assess McBurney's point for potential appendicitis next visit.    CMS Impairment/Limitation/Restriction for FOTO Lumbar Survey     Therapist reviewed FOTO scores for Alexandru Najera on 2/11/22  FOTO documents entered into EPIC - see Media section.     Limitation Score: 52%  Predicted Goal: 48%     Category: Mobility     Treatment   Charges based on 1-1 tx:   therapeutic exercises for 35 minutes:     Prone on elbows, 10"x10  Standing repeated extension, 5"X10  Seated thoracic ext with OP, 10"x10  R/L SL open book stretch 10x5"  Bridging, 2x10, 3" hold   Abdominal bracing with red ball 5" hold 20x  Ball squeezes 5" hold 30x  Hooklying BKFO YTB 30x  LTR on red ball 5" hold ea x2'            manual therapy techniques for 00 minutes:   R/L lumbar rotation +SB JM in SL, Gr III/IV   Seated thoracic side bending, rotation and extension JMs R/L Gr III/IV J M    neuromuscular re-education for 25 minutes:     TrA 5"x20   TrA + marching 2x10   Anti rotations with TrA 2x10 YTB  Shld pulldowns with TrA YTB 2x10  Wall pushups with abdominal bracing 2x10      therapeutic activities for minutes:     Patient Education and Home Exercises     Home Exercises Provided and Patient Education Provided     Education provided:   - TrA activation     Written Home Exercises Provided: Patient instructed to cont prior HEP. Exercises were reviewed and Alexandru was able to demonstrate them prior to the end of the session.  Alexandru demonstrated good  understanding of the education provided. See EMR under Patient Instructions for exercises provided during therapy sessions    ASSESSMENT     Pt tolerated tx session fairly today. Pt requires frequent standing rest breaks 2/2 onset of abdominal " "discomfort. Pt's FOTO score improved today and he feels that his exercises are "getting easier". PT encouraged pt to continue with his HEP and incorporate a walking program as he tolerates. Continue PT POC.      Alexandru is making fair progress towards meeting set goals.   Pt prognosis is Good.     Pt will continue to benefit from skilled outpatient physical therapy to address the deficits listed in the problem list box on initial evaluation, provide pt/family education and to maximize pt's level of independence in the home and community environment.     Pt's spiritual, cultural and educational needs considered and pt agreeable to plan of care and goals.     Anticipated barriers to physical therapy: none    GOALS:  Short Term Goals (4 Weeks):  1. Patient will be compliant with home exercise program to supplement therapy in promoting functional mobility. (progressing, not met)    2. Patient will perform deadlift with good control to demonstrate improved core strength. (progressing, not met)    3. Patient will report no pain during thoracolumbar active range of motion to promote functional mobility.  (progressing, not met)    4. Patient will improve impaired lower extremity hip manual muscle tests  to >/=4/5 to improve strength for functional tasks. (progressing, not met)       Long Term Goals (6 Weeks):   1. Patient will improve FOTO score to </= 48% limited to decrease perceived limitation with maintaining/changing body position. (progressing, not met)    2. Patient will perform dying bug exercise with good control to demonstrate improved core strength.  (progressing, not met)    3. Patient will improve impaired lower extremity hip manual muscle tests to >/=4+/5 to improve strength for functional tasks.  (progressing, not met)    4. Patient will tolerate sitting for 30 minutes with no increase in low back pain to return to PLOF.  (progressing, not met)      PLAN     Continue with PHYSICAL THERAPY addressing spinal " mobility, core strengthening     Lupe Hoffman, PT

## 2022-02-15 ENCOUNTER — CLINICAL SUPPORT (OUTPATIENT)
Dept: REHABILITATION | Facility: OTHER | Age: 37
End: 2022-02-15
Payer: MEDICAID

## 2022-02-15 DIAGNOSIS — M54.16 LUMBAR RADICULOPATHY: ICD-10-CM

## 2022-02-15 DIAGNOSIS — R19.8 ABDOMINAL WEAKNESS: ICD-10-CM

## 2022-02-15 PROCEDURE — 97110 THERAPEUTIC EXERCISES: CPT | Mod: PN,CQ

## 2022-02-15 NOTE — PROGRESS NOTES
[facility]   Physical Therapy Treatment Note     Name: Alexandru Najera  Clinic Number: [id number]    Therapy Diagnosis:   Encounter Diagnoses   Name Primary?    Lumbar radiculopathy     Abdominal weakness      Physician: Александр Quintanilla*    Visit Date: 2/15/2022    Physician Orders: Evaluate and Treat  Medical Diagnosis from Referral: Radiculopathy, lumbar region [M54.16]     Evaluation Date: 1/19/2022  Authorization Period Expiration: 1/19/23  Plan of Care Expiration: 1/19/2022 to 4/19/22  Visit # / Visits authorized: 5/20    Time In: 0756  Time Out: 0900  Total Billable Time: 38 minutes    SUBJECTIVE   Pt reports he feels like his pain has been elevated the past few days. States he has seen some improvement despite the increase in pain.        Occupation: Currently unemployed; Would like to find a work from home job in the near future  Prior Level of Function: No limitations  Current Level of Function: Pt only able to tolerate sitting for 30 minutes 2/2 abdominal pain     Pain:  Current: 3-4/10    Location: umbilical region   Description: Sharp     Pts goals: Pt would like to return to sitting with no increase in abdominal pain.    He was not compliant with home exercise program.  Response to previous treatment: felt fine, no additional soreness.   Function: unable to exercise and decreased ADL tolerance     OBJECTIVE   1/19/2022:  Posture: Forward head, rounded shoulders. Pt very slender/underweight.  Palpation: TTP at bellybutton- no pulsing noted upon palpation  Sensation: Intact  Deep tendon reflexes: Intact     Lumbar Active range of motion  Pain/dysfunction with movement:   Flexion WNL painful   Extension WNL Provokes abdominal symptoms   Right side bending WNL painful   Left side bending WNL     Right rotation WNL painful   Left rotation WNL        Lower extremity manual muscle tests  Right Left   Hip flexion 4/5 4/5   Hip extension 4+/5 5/5   Hip abduction 3+/5 3+/5   Hip  "adduction 4/5 4+/5   Hip internal rotation 4/5 4+/5   Hip external rotation 4/5 4/5   Knee flexion 4+/5 4/5   Knee extension 4/5 4+/5   Ankle dorsiflexion 5/5 5/5   Ankle plantarflexion 5/5 5/5   Ankle inversion 5/5 5/5   Ankle eversion 5/5 5/5      SLR neural tension: (-) bilaterally     Plan to assess McBurney's point for potential appendicitis next visit.    CMS Impairment/Limitation/Restriction for FOTO Lumbar Survey     Therapist reviewed FOTO scores for Alexandru Najera on 2/11/22  FOTO documents entered into EPIC - see Media section.     Limitation Score: 52%  Predicted Goal: 48%     Category: Mobility     Treatment   Charges based on 1-1 tx:   therapeutic exercises for 23 minutes:     Prone on elbows, 10"x10  Standing repeated extension, 5"X10  Seated thoracic ext with OP, 10"x10  R/L SL open book stretch 10x5"  Bridging, 2x10, 3" hold   Abdominal bracing with red ball 5" hold 20x  Ball squeezes 5" hold 30x  Hooklying BKFO YTB 30x  LTR on red ball 5" hold ea x2'             manual therapy techniques for 00 minutes:   R/L lumbar rotation +SB JM in SL, Gr III/IV   Seated thoracic side bending, rotation and extension JMs R/L Gr III/IV J M    neuromuscular re-education for 15 minutes:     TrA 5"x20   TrA + marching 2x10   Anti rotations with TrA 2x10 YTB  Shld pulldowns with TrA YTB 2x10  Wall pushups with abdominal bracing 2x10      therapeutic activities for minutes:     Patient Education and Home Exercises     Home Exercises Provided and Patient Education Provided     Education provided:   - TrA activation     Written Home Exercises Provided: Patient instructed to cont prior HEP. Exercises were reviewed and Alexandru was able to demonstrate them prior to the end of the session.  Alexandru demonstrated good  understanding of the education provided. See EMR under Patient Instructions for exercises provided during therapy sessions    ASSESSMENT     Pt tolerated tx session fair today. Increased intra-abdominal pain " continues to be reported distal to the navel region. Pt was able to demonstrate improved TrA activation as well as slightly improved tolerance with supine exercises. Continued with deep core/abdominal strengthening for improved core strength.     Alexandru is making fair progress towards meeting set goals.   Pt prognosis is Good.     Pt will continue to benefit from skilled outpatient physical therapy to address the deficits listed in the problem list box on initial evaluation, provide pt/family education and to maximize pt's level of independence in the home and community environment.     Pt's spiritual, cultural and educational needs considered and pt agreeable to plan of care and goals.     Anticipated barriers to physical therapy: none    GOALS:  Short Term Goals (4 Weeks):  1. Patient will be compliant with home exercise program to supplement therapy in promoting functional mobility. (progressing, not met)    2. Patient will perform deadlift with good control to demonstrate improved core strength. (progressing, not met)    3. Patient will report no pain during thoracolumbar active range of motion to promote functional mobility.  (progressing, not met)    4. Patient will improve impaired lower extremity hip manual muscle tests  to >/=4/5 to improve strength for functional tasks. (progressing, not met)       Long Term Goals (6 Weeks):   1. Patient will improve FOTO score to </= 48% limited to decrease perceived limitation with maintaining/changing body position. (progressing, not met)    2. Patient will perform dying bug exercise with good control to demonstrate improved core strength.  (progressing, not met)    3. Patient will improve impaired lower extremity hip manual muscle tests to >/=4+/5 to improve strength for functional tasks.  (progressing, not met)    4. Patient will tolerate sitting for 30 minutes with no increase in low back pain to return to PLOF.  (progressing, not met)      PLAN     Continue with  PHYSICAL THERAPY addressing spinal mobility, core strengthening     Oliver Shaver, PTA

## 2022-02-18 ENCOUNTER — CLINICAL SUPPORT (OUTPATIENT)
Dept: REHABILITATION | Facility: OTHER | Age: 37
End: 2022-02-18
Payer: MEDICAID

## 2022-02-18 DIAGNOSIS — M54.16 LUMBAR RADICULOPATHY: ICD-10-CM

## 2022-02-18 DIAGNOSIS — R19.8 ABDOMINAL WEAKNESS: ICD-10-CM

## 2022-02-18 PROCEDURE — 97110 THERAPEUTIC EXERCISES: CPT | Mod: PN,CQ

## 2022-02-18 NOTE — PROGRESS NOTES
[facility]   Physical Therapy Treatment Note     Name: Alexandru Najera  Clinic Number: [id number]    Therapy Diagnosis:   Encounter Diagnoses   Name Primary?    Lumbar radiculopathy     Abdominal weakness      Physician: Александр Quintanilla*    Visit Date: 2/18/2022    Physician Orders: Evaluate and Treat  Medical Diagnosis from Referral: Radiculopathy, lumbar region [M54.16]     Evaluation Date: 1/19/2022  Authorization Period Expiration: 1/19/23  Plan of Care Expiration: 1/19/2022 to 4/19/22  Visit # / Visits authorized: 6/20    Time In: 0950  Time Out: 0948  Total Billable Time: 53 minutes    SUBJECTIVE   Pt reports he feels a little better today. Pain comes in goes. Still working on getting the MRI approved by his insurance.        Occupation: Currently unemployed; Would like to find a work from home job in the near future  Prior Level of Function: No limitations  Current Level of Function: Pt only able to tolerate sitting for 30 minutes 2/2 abdominal pain     Pain:  Current: 2/10    Location: umbilical region   Description: Sharp     Pts goals: Pt would like to return to sitting with no increase in abdominal pain.    He was not compliant with home exercise program.  Response to previous treatment: felt fine, no additional soreness.   Function: unable to exercise and decreased ADL tolerance     OBJECTIVE   1/19/2022:  Posture: Forward head, rounded shoulders. Pt very slender/underweight.  Palpation: TTP at bellybutton- no pulsing noted upon palpation  Sensation: Intact  Deep tendon reflexes: Intact     Lumbar Active range of motion  Pain/dysfunction with movement:   Flexion WNL painful   Extension WNL Provokes abdominal symptoms   Right side bending WNL painful   Left side bending WNL     Right rotation WNL painful   Left rotation WNL        Lower extremity manual muscle tests  Right Left   Hip flexion 4/5 4/5   Hip extension 4+/5 5/5   Hip abduction 3+/5 3+/5   Hip adduction 4/5  "4+/5   Hip internal rotation 4/5 4+/5   Hip external rotation 4/5 4/5   Knee flexion 4+/5 4/5   Knee extension 4/5 4+/5   Ankle dorsiflexion 5/5 5/5   Ankle plantarflexion 5/5 5/5   Ankle inversion 5/5 5/5   Ankle eversion 5/5 5/5      SLR neural tension: (-) bilaterally     Plan to assess McBurney's point for potential appendicitis next visit.    CMS Impairment/Limitation/Restriction for FOTO Lumbar Survey     Therapist reviewed FOTO scores for Alexandru Najera on 2/11/22  FOTO documents entered into EPIC - see Media section.     Limitation Score: 52%  Predicted Goal: 48%     Category: Mobility     Treatment   Charges based on 1-1 tx:   therapeutic exercises for 23 minutes:     Prone on elbows, 10"x10  Standing repeated extension, 5"X10  Seated thoracic ext with OP, 10"x10  R/L SL open book stretch 10x5"  Bridging, 3x10, 3" hold   +Dying bugs iso with red ball 5" hold 20x  Ball squeezes 5" hold 30x  Hooklying BKFO YTB 30x  LTR on red ball 5" hold ea x3'             manual therapy techniques for 00 minutes:   R/L lumbar rotation +SB JM in SL, Gr III/IV   Seated thoracic side bending, rotation and extension JMs R/L Gr III/IV J M    neuromuscular re-education for 30 minutes:     +March hesitation 40ft x2  +Seated on blue SB, wand flexion lift OH w/TrA 2x10  Anti rotations with TrA 2x10 YTB  Shld pulldowns with TrA YTB 2x10  +Table pushups on BOSU at EOM with abdominal bracing 2x10  +Planks at wall 3x30"       therapeutic activities for minutes:     Patient Education and Home Exercises     Home Exercises Provided and Patient Education Provided     Education provided:   - TrA activation     Written Home Exercises Provided: Patient instructed to cont prior HEP. Exercises were reviewed and Alexandru was able to demonstrate them prior to the end of the session.  Alexandru demonstrated good  understanding of the education provided. See EMR under Patient Instructions for exercises provided during therapy sessions    ASSESSMENT     Pt " tolerated tx session well today. Pt was progressed deep core/abdominal strengthening with improved endurance and slightly decreaed symptoms/pain was noted. Muscle quivering in hip flexors/abdominals is noted with march hesitation exercises today.      Alexandru is making fair progress towards meeting set goals.   Pt prognosis is Good.     Pt will continue to benefit from skilled outpatient physical therapy to address the deficits listed in the problem list box on initial evaluation, provide pt/family education and to maximize pt's level of independence in the home and community environment.     Pt's spiritual, cultural and educational needs considered and pt agreeable to plan of care and goals.     Anticipated barriers to physical therapy: none    GOALS:  Short Term Goals (4 Weeks):  1. Patient will be compliant with home exercise program to supplement therapy in promoting functional mobility. (progressing, not met)    2. Patient will perform deadlift with good control to demonstrate improved core strength. (progressing, not met)    3. Patient will report no pain during thoracolumbar active range of motion to promote functional mobility.  (progressing, not met)    4. Patient will improve impaired lower extremity hip manual muscle tests  to >/=4/5 to improve strength for functional tasks. (progressing, not met)       Long Term Goals (6 Weeks):   1. Patient will improve FOTO score to </= 48% limited to decrease perceived limitation with maintaining/changing body position. (progressing, not met)    2. Patient will perform dying bug exercise with good control to demonstrate improved core strength.  (progressing, not met)    3. Patient will improve impaired lower extremity hip manual muscle tests to >/=4+/5 to improve strength for functional tasks.  (progressing, not met)    4. Patient will tolerate sitting for 30 minutes with no increase in low back pain to return to PLOF.  (progressing, not met)      PLAN     Continue with  PHYSICAL THERAPY addressing spinal mobility, core strengthening     Oliver Shaver, PTA

## 2022-02-21 NOTE — PROGRESS NOTES
[facility]   Physical Therapy Treatment Note     Name: Alexandru Najera  Clinic Number: [id number]    Therapy Diagnosis:   Encounter Diagnoses   Name Primary?    Lumbar radiculopathy Yes    Abdominal weakness      Physician: Александр Quintanilla    Visit Date: 2/22/2022    Physician Orders: Evaluate and Treat  Medical Diagnosis from Referral: Radiculopathy, lumbar region [M54.16]     Evaluation Date: 1/19/2022  Authorization Period Expiration: 1/19/23  Plan of Care Expiration: 1/19/2022 to 4/19/22  Visit # / Visits authorized: 7/20    Time In: 10:03am  Time Out: 11:05am  Total Billable Time: 62 minutes    SUBJECTIVE   Occupation: Currently unemployed; Would like to find a work from home job in the near future  Prior Level of Function: No limitations  Current Level of Function: Pt only able to tolerate sitting for 30 minutes 2/2 abdominal pain     Pain:  Current: 2/10    Location: umbilical region   Description: Sharp     Pts goals: Pt would like to return to sitting with no increase in abdominal pain.    HOME EXERCISE PROGRAM: no comment   Response to previous treatment: feels increased pain in end ranges of lumbar flexion/extension   Function: unable to exercise and decreased ADL tolerance     OBJECTIVE   1/19/2022:  Posture: Forward head, rounded shoulders. Pt very slender/underweight.  Palpation: TTP at bellybutton- no pulsing noted upon palpation  Sensation: Intact  Deep tendon reflexes: Intact     Lumbar Active range of motion  Pain/dysfunction with movement:   Flexion WNL painful   Extension WNL Provokes abdominal symptoms   Right side bending WNL painful   Left side bending WNL     Right rotation WNL painful   Left rotation WNL        Lower extremity manual muscle tests  Right Left   Hip flexion 4/5 4/5   Hip extension 4+/5 5/5   Hip abduction 3+/5 3+/5   Hip adduction 4/5 4+/5   Hip internal rotation 4/5 4+/5   Hip external rotation 4/5 4/5   Knee flexion 4+/5 4/5   Knee  "extension 4/5 4+/5   Ankle dorsiflexion 5/5 5/5   Ankle plantarflexion 5/5 5/5   Ankle inversion 5/5 5/5   Ankle eversion 5/5 5/5      SLR neural tension: (-) bilaterally     Plan to assess McBurney's point for potential appendicitis next visit.    CMS Impairment/Limitation/Restriction for FOTO Lumbar Survey     Therapist reviewed FOTO scores for Alexandru Najera on 2/11/22  FOTO documents entered into EPIC - see Media section.     Limitation Score: 52%  Predicted Goal: 48%     Category: Mobility     Treatment   Charges based on 1-1 tx:   therapeutic exercises for 32 minutes:   Prone on elbows, 10"x10  R/L SL open book stretch 10x10"   Ball squeezes 5" hold 30x  Hooklying BKFO YTB 30x  Seated thoracic ext with OP, 10"x10  Standing repeated extension, 10"X10    Supine with blue exercise ball:   LTR on blue ball 5" hold ea x3'    Heels on blue ball, knees to chest <-> LEs extending for core activation, x 30   Bridging LEs on blue ball, x 30   Dying bugs iso with blue ball x 30  +Standing hip ABD R/L LE, hands pushing into the ball      manual therapy techniques for 00 minutes:   R/L lumbar rotation +SB JM in SL, Gr III/IV   Seated thoracic side bending, rotation and extension JMs R/L Gr III/IV J M    neuromuscular re-education for 30 minutes:   March hesitation with #10 40ft x2  Seated on blue SB, wand flexion lift OH w/TrA 2x10  Anti rotations with TrA 3x10 GTB  Shld pulldowns with TrA and marching, GTB 3x10  Table pushups on BOSU at EOM with abdominal bracing 2x10  Planks at wall 3x30"     therapeutic activities for minutes:     Patient Education and Home Exercises     Home Exercises Provided and Patient Education Provided     Education provided:   - TrA activation     Written Home Exercises Provided: Patient instructed to cont prior HEP. Exercises were reviewed and Alexandru was able to demonstrate them prior to the end of the session.  Alexandru demonstrated good  understanding of the education provided. See EMR under " Patient Instructions for exercises provided during therapy sessions    ASSESSMENT     Patient presents with improved activity and transitional tolerance today. Pt continued with deep core/abdominal strengthening with improved endurance and slightly decreaed symptoms/pain noted.     Alexandru is making fair progress towards meeting set goals.   Pt prognosis is Good.     Pt will continue to benefit from skilled outpatient physical therapy to address the deficits listed in the problem list box on initial evaluation, provide pt/family education and to maximize pt's level of independence in the home and community environment.     Pt's spiritual, cultural and educational needs considered and pt agreeable to plan of care and goals.     Anticipated barriers to physical therapy: none    GOALS:  Short Term Goals (4 Weeks):  1. Patient will be compliant with home exercise program to supplement therapy in promoting functional mobility. (progressing, not met)    2. Patient will perform deadlift with good control to demonstrate improved core strength. (progressing, not met)    3. Patient will report no pain during thoracolumbar active range of motion to promote functional mobility.  (progressing, not met)    4. Patient will improve impaired lower extremity hip manual muscle tests  to >/=4/5 to improve strength for functional tasks. (progressing, not met)       Long Term Goals (6 Weeks):   1. Patient will improve FOTO score to </= 48% limited to decrease perceived limitation with maintaining/changing body position. (progressing, not met)    2. Patient will perform dying bug exercise with good control to demonstrate improved core strength.  (progressing, not met)    3. Patient will improve impaired lower extremity hip manual muscle tests to >/=4+/5 to improve strength for functional tasks.  (progressing, not met)    4. Patient will tolerate sitting for 30 minutes with no increase in low back pain to return to PLOF.  (progressing, not  met)      PLAN     Continue with PHYSICAL THERAPY addressing spinal mobility, core strengthening     Mindy Gill, PT

## 2022-02-22 ENCOUNTER — CLINICAL SUPPORT (OUTPATIENT)
Dept: REHABILITATION | Facility: OTHER | Age: 37
End: 2022-02-22
Payer: MEDICAID

## 2022-02-22 DIAGNOSIS — M54.16 LUMBAR RADICULOPATHY: Primary | ICD-10-CM

## 2022-02-22 DIAGNOSIS — R19.8 ABDOMINAL WEAKNESS: ICD-10-CM

## 2022-02-22 PROCEDURE — 97110 THERAPEUTIC EXERCISES: CPT | Mod: PN

## 2022-02-22 PROCEDURE — 97112 NEUROMUSCULAR REEDUCATION: CPT | Mod: PN

## 2022-02-25 ENCOUNTER — CLINICAL SUPPORT (OUTPATIENT)
Dept: REHABILITATION | Facility: OTHER | Age: 37
End: 2022-02-25
Payer: MEDICAID

## 2022-02-25 DIAGNOSIS — R19.8 ABDOMINAL WEAKNESS: ICD-10-CM

## 2022-02-25 DIAGNOSIS — M54.16 LUMBAR RADICULOPATHY: Primary | ICD-10-CM

## 2022-02-25 PROCEDURE — 97110 THERAPEUTIC EXERCISES: CPT | Mod: PN,CQ

## 2022-02-25 NOTE — PROGRESS NOTES
[facility]   Physical Therapy Treatment Note     Name: Alexandru Najera  Clinic Number: [id number]    Therapy Diagnosis:   Encounter Diagnoses   Name Primary?    Lumbar radiculopathy Yes    Abdominal weakness      Physician: Александр Quintanilla    Visit Date: 2/25/2022    Physician Orders: Evaluate and Treat  Medical Diagnosis from Referral: Radiculopathy, lumbar region [M54.16]     Evaluation Date: 1/19/2022  Authorization Period Expiration: 1/19/23  Plan of Care Expiration: 1/19/2022 to 4/19/22  Visit # / Visits authorized: 8/20    Time In: 0947  Time Out: 1050  Total Billable Time: 39 minutes    SUBJECTIVE     States his MD told him his abdominal issues could be related to the Long Thoracic Nerve and maybe contributing to his weakness as well.     Occupation: Currently unemployed; Would like to find a work from home job in the near future  Prior Level of Function: No limitations  Current Level of Function: Pt only able to tolerate sitting for 30 minutes 2/2 abdominal pain     Pain:  Current: 2/10    Location: umbilical region   Description: Sharp     Pts goals: Pt would like to return to sitting with no increase in abdominal pain.    HOME EXERCISE PROGRAM: no comment   Response to previous treatment: feels increased pain in end ranges of lumbar flexion/extension   Function: unable to exercise and decreased ADL tolerance     OBJECTIVE   1/19/2022:  Posture: Forward head, rounded shoulders. Pt very slender/underweight.  Palpation: TTP at bellybutton- no pulsing noted upon palpation  Sensation: Intact  Deep tendon reflexes: Intact     Lumbar Active range of motion  Pain/dysfunction with movement:   Flexion WNL painful   Extension WNL Provokes abdominal symptoms   Right side bending WNL painful   Left side bending WNL     Right rotation WNL painful   Left rotation WNL        Lower extremity manual muscle tests  Right Left   Hip flexion 4/5 4/5   Hip extension 4+/5 5/5   Hip abduction  "3+/5 3+/5   Hip adduction 4/5 4+/5   Hip internal rotation 4/5 4+/5   Hip external rotation 4/5 4/5   Knee flexion 4+/5 4/5   Knee extension 4/5 4+/5   Ankle dorsiflexion 5/5 5/5   Ankle plantarflexion 5/5 5/5   Ankle inversion 5/5 5/5   Ankle eversion 5/5 5/5      SLR neural tension: (-) bilaterally     Plan to assess McBurney's point for potential appendicitis next visit.    CMS Impairment/Limitation/Restriction for FOTO Lumbar Survey     Therapist reviewed FOTO scores for Alexandru Najera on 2/11/22  FOTO documents entered into [employer] - see Media section.     Limitation Score: 52%  Predicted Goal: 48%     Category: Mobility     Treatment   Charges based on 1-1 tx:   therapeutic exercises for 24 minutes:   Prone on elbows, 10"x10  R/L SL open book stretch 10x10"   Ball squeezes 5" hold 30x  Hooklying BKFO YTB 30x  Seated thoracic ext with OP, 10"x10  Standing repeated extension, 10"X10    Supine with blue exercise ball:   LTR on blue ball 5" hold ea x3'    Heels on blue ball, knees to chest <-> LEs extending for core activation, x 30   Bridging LEs on blue ball, x 30   Dying bugs iso with blue ball x 30  Standing hip ABD R/L LE, hands pushing into the ball      manual therapy techniques for 00 minutes:   R/L lumbar rotation +SB JM in SL, Gr III/IV   Seated thoracic side bending, rotation and extension JMs R/L Gr III/IV J M    neuromuscular re-education for 15 minutes:   March hesitation with #10 40ft x2  Seated on blue SB, wand flexion lift OH w/TrA 2x10  Anti rotations with TrA 3x10 GTB  Shld pulldowns with TrA and marching, GTB 3x10  Table pushups on BOSU at EOM with abdominal bracing 2x10  Planks at wall 3x30"     therapeutic activities for minutes:     Patient Education and Home Exercises     Home Exercises Provided and Patient Education Provided     Education provided:   - TrA activation     Written Home Exercises Provided: Patient instructed to cont prior HEP. Exercises were reviewed and Alexandru was able to " demonstrate them prior to the end of the session.  Alexandru demonstrated good  understanding of the education provided. See EMR under Patient Instructions for exercises provided during therapy sessions    ASSESSMENT     Patient presents with improved activity and transitional tolerance today. Weakness and muscle quivering in abdominals were noted with anti-rotations and with shld pull down abd iso. Pt continued with deep core/abdominal strengthening with improved endurance and slightly decreaed symptoms/pain noted.     Alexandru is making fair progress towards meeting set goals.   Pt prognosis is Good.     Pt will continue to benefit from skilled outpatient physical therapy to address the deficits listed in the problem list box on initial evaluation, provide pt/family education and to maximize pt's level of independence in the home and community environment.     Pt's spiritual, cultural and educational needs considered and pt agreeable to plan of care and goals.     Anticipated barriers to physical therapy: none    GOALS:  Short Term Goals (4 Weeks):  1. Patient will be compliant with home exercise program to supplement therapy in promoting functional mobility. (progressing, not met)    2. Patient will perform deadlift with good control to demonstrate improved core strength. (progressing, not met)    3. Patient will report no pain during thoracolumbar active range of motion to promote functional mobility.  (progressing, not met)    4. Patient will improve impaired lower extremity hip manual muscle tests  to >/=4/5 to improve strength for functional tasks. (progressing, not met)       Long Term Goals (6 Weeks):   1. Patient will improve FOTO score to </= 48% limited to decrease perceived limitation with maintaining/changing body position. (progressing, not met)    2. Patient will perform dying bug exercise with good control to demonstrate improved core strength.  (progressing, not met)    3. Patient will improve impaired  lower extremity hip manual muscle tests to >/=4+/5 to improve strength for functional tasks.  (progressing, not met)    4. Patient will tolerate sitting for 30 minutes with no increase in low back pain to return to PLOF.  (progressing, not met)      PLAN     Continue with PHYSICAL THERAPY addressing spinal mobility, core strengthening     Oliver Shaver, PTA

## 2022-03-02 ENCOUNTER — CLINICAL SUPPORT (OUTPATIENT)
Dept: REHABILITATION | Facility: OTHER | Age: 37
End: 2022-03-02
Payer: MEDICAID

## 2022-03-02 DIAGNOSIS — R19.8 ABDOMINAL WEAKNESS: ICD-10-CM

## 2022-03-02 DIAGNOSIS — M54.16 LUMBAR RADICULOPATHY: Primary | ICD-10-CM

## 2022-03-02 PROCEDURE — 97110 THERAPEUTIC EXERCISES: CPT | Mod: PN

## 2022-03-02 NOTE — PROGRESS NOTES
Outpatient Therapy Updated Plan of Care     Name: Alexandru Najera  Clinic Number: [id number]    Therapy Diagnosis:   Encounter Diagnoses   Name Primary?    Lumbar radiculopathy Yes    Abdominal weakness      Physician: Александр Quintanilla*    Visit Date: 3/2/2022    Physician Orders: Evaluate and Treat  Medical Diagnosis from Referral: Radiculopathy, lumbar region [M54.16]     Evaluation Date: 1/19/2022  Authorization Period Expiration:3/4/22  Plan of Care Expiration: 5/2/22  Visit # / Visits authorized: 10/20    Time In: 10:08am  Time Out: 11:15am  Total Billable Time: 67 minutes    SUBJECTIVE     States his MD told him his abdominal issues could be related to the Long Thoracic Nerve and maybe contributing to his weakness as well. Return to MD March 23rd.     States that he is waiting for MRI results and neuro appointment.     Occupation: Currently unemployed; Would like to find a work from home job in the near future  Prior Level of Function: No limitations  Current Level of Function: Pt only able to tolerate sitting for 30 minutes 2/2 abdominal pain     Pain:  Current: 2/10    Location: umbilical region   Description: Sharp     Pts goals: Pt would like to return to sitting with no increase in abdominal pain.    HOME EXERCISE PROGRAM: did open book stretches this morning before PHYSICAL THERAPY session   Response to previous treatment: back hurts sometimes but stomach is feeling the same   Function: able to stand for a few hours yesterday with less limitation due to pain at stomach, limitation in bending forward to reach feet      OBJECTIVE   3/2/2022:  Posture: Forward head, rounded shoulders. Pt very slender/underweight.  Palpation: TTP at naval - no pulsing noted upon palpation     Lumbar Active range of motion    Flexion 51   Extension 11   Right side bending 10   Left side bending 10   Right rotation R<L   Left rotation R<L      Lower extremity manual muscle tests  Right Left   Hip flexion 4+/5 4/5   Hip  "extension 4/5 4-/5   Hip abduction 4+/5 4/5   Hip adduction 4/5 4+/5   Hip internal rotation 4/5 4+/5   Hip external rotation 4/5 4/5   Knee flexion 4+/5 4/5   Knee extension 4/5 4+/5   Ankle dorsiflexion 5/5 5/5   Ankle plantarflexion 5/5 5/5   Ankle inversion 5/5 5/5   Ankle eversion 5/5 5/5      SLR neural tension: (-) bilaterally     Quadruped: protraction/retraction of shoulders - weakness evident via shakiness bilaterally, 30"    Prone on forearms and knees -> toes: 30" each    Prone on hands and toes: 30"     CMS Impairment/Limitation/Restriction for FOTO Lumbar Survey     Therapist reviewed FOTO scores for Alexandru Najera on 2/25/22  FOTO documents entered into [employer] - see Media section.     Limitation Score: 56%  Predicted Goal: 48%     Category: Mobility     Treatment   Charges based on 1-1 tx:   therapeutic exercises for 52 minutes:   Prone on elbows, 10"x10  R/L SL open book stretch 10x10"   Ball squeezes 5" hold 30x  Hooklying BKFO YTB 30x  Seated thoracic ext with OP, 10"x10  Standing repeated extension, 10"X10  Standing ball roll out (blue ball on mat, modified tandem stand) 10" x 10  Standing hip ABD R/L LE, hands pushing into the ball   Update to PHYSICAL THERAPY POC     Supine with blue exercise ball:   LTR on blue ball 5" hold ea x3'    Heels on blue ball, knees to chest <-> LEs extending for core activation, x 30   Bridging LEs on blue ball, 5" hold x 30   Dying bugs iso with blue ball x 30     manual therapy techniques for 00 minutes:   R/L lumbar rotation +SB JM in SL, Gr III/IV   Seated thoracic side bending, rotation and extension JMs R/L Gr III/IV J M    neuromuscular re-education for 15 minutes:   March hesitation with #10 40ft x2  Seated on blue SB, wand flexion lift OH w/TrA 2x10  Anti rotations with TrA 3x10 GTB  Shld pulldowns with TrA and marching, GTB 3x10  Table pushups on BOSU at EOM with abdominal bracing 3x10  Planks at wall 3x30"     therapeutic activities for minutes:   (initiate) " dead lift     Patient Education and Home Exercises     Home Exercises Provided and Patient Education Provided     Education provided:   - TrA activation     Written Home Exercises Provided: Patient instructed to cont prior HEP. Exercises were reviewed and Alexandru was able to demonstrate them prior to the end of the session.  Alexandru demonstrated good  understanding of the education provided. See EMR under Patient Instructions for exercises provided during therapy sessions    ASSESSMENT     Patient presents with improved activity and transitional tolerance since onset of PHYSICAL THERAPY POC. Patient presents with improved lumbar ACTIVE RANGE OF MOTION and hip strength in addition to being able to complete planks today but he exhibits weakness in quadruped with pronation/supination . He continued with deep core/abdominal strengthening with improved endurance and slightly decreaed symptoms/pain noted.     Alexandru is making fair progress towards meeting set goals.   Pt prognosis is Good.     Pt will continue to benefit from skilled outpatient physical therapy to address the deficits listed in the problem list box on initial evaluation, provide pt/family education and to maximize pt's level of independence in the home and community environment.     Pt's spiritual, cultural and educational needs considered and pt agreeable to plan of care and goals.     Anticipated barriers to physical therapy: none    GOALS:  Short Term Goals (4 Weeks):  1. Patient will be compliant with home exercise program to supplement therapy in promoting functional mobility. (progressing, not met, 3/2/22)    2. Patient will perform deadlift with good control to demonstrate improved core strength. (progressing, not met, 3/2/22)    3. Patient will report no pain during thoracolumbar active range of motion to promote functional mobility.  (progressing, not met, 3/2/22)    4. Patient will improve impaired lower extremity hip manual muscle tests  to >/=4/5  to improve strength for functional tasks. (progressing, nearly met, 3/2/22)       Long Term Goals (6 Weeks):   1. Patient will improve FOTO score to </= 48% limited to decrease perceived limitation with maintaining/changing body position. (ongoing, not met, 3/2/22)    2. Patient will perform dying bug exercise with good control to demonstrate improved core strength.  (progressing, nearly met, 3/2/22)    3. Patient will improve impaired lower extremity hip manual muscle tests to >/=4+/5 to improve strength for functional tasks.  (progressing, not met, 3/2/22)    4. Patient will tolerate sitting for 30 minutes with no increase in low back pain to return to PLOF.  (progressing, not met, 3/2/22)      Previous Short Term Goals Status:   Progressing   New Short Term Goals Status:   Continue per POC  Long Term Goal Status:   continue per initial plan of care.  Reasons for Recertification of Therapy:   Update PHYSICAL THERAPY POC    PLAN     Continue with PHYSICAL THERAPY addressing spinal mobility, core strengthening     Updated Certification Period: 3/2/2022 to 05/02/2022  Recommended Treatment Plan: 2 times per week for 3 weeks: Cervical/Lumbar Traction, Electrical Stimulation, Gait Training, Manual Therapy, Moist Heat/ Ice, Neuromuscular Re-ed, Patient Education, Self Care, Therapeutic Activities and Therapeutic Exercise    I CERTIFY THE NEED FOR THESE SERVICES FURNISHED UNDER THIS PLAN OF TREATMENT AND WHILE UNDER MY CARE    Physician's comments:        Physician's Signature: ___________________________________________________      Mindy Gill, PT

## 2022-03-03 NOTE — PROGRESS NOTES
Outpatient Therapy Treatment Note     Name: Alexandru Najera  Clinic Number: [id number]    Therapy Diagnosis:   Encounter Diagnoses   Name Primary?    Lumbar radiculopathy Yes    Abdominal weakness      Physician: Александр Quintanilla*    Visit Date: 3/4/2022    Physician Orders: Evaluate and Treat  Medical Diagnosis from Referral: Radiculopathy, lumbar region [M54.16]     Evaluation Date: 1/19/2022  Authorization Period Expiration:3/4/22  Plan of Care Expiration: 5/2/22  Visit # / Visits authorized: 11/20    Time In: 10:13am  Time Out: 11:05am  Total Billable Time: 52 minutes    SUBJECTIVE     States his MD told him his abdominal issues could be related to the Long Thoracic Nerve and maybe contributing to his weakness as well. Return to MD March 23rd.     States that he is waiting for MRI results and neuro appointment.     Occupation: Currently unemployed; Would like to find a work from home job in the near future  Prior Level of Function: No limitations  Current Level of Function: Pt only able to tolerate sitting for 30 minutes 2/2 abdominal pain     Pain:  Current: 2/10    Location: umbilical region   Description: Sharp     Pts goals: Pt would like to return to sitting with no increase in abdominal pain.    HOME EXERCISE PROGRAM: did open book stretches this morning before PHYSICAL THERAPY session   Response to previous treatment: felt sharp pain at center of stomach when getting out of his car after last visit and states that it usually lasts several hours but it only lasted for about an hour this time.   Function: able to stand for a few hours with less limitation due to pain at stomach, limitation in bending forward to reach feet      OBJECTIVE   3/2/2022:  Posture: Forward head, rounded shoulders. Pt very slender/underweight.  Palpation: TTP at naval - no pulsing noted upon palpation     Lumbar Active range of motion    Flexion 51   Extension 11   Right side bending 10   Left side bending 10   Right  "rotation R<L   Left rotation R<L      Lower extremity manual muscle tests  Right Left   Hip flexion 4+/5 4/5   Hip extension 4/5 4-/5   Hip abduction 4+/5 4/5   Hip adduction 4/5 4+/5   Hip internal rotation 4/5 4+/5   Hip external rotation 4/5 4/5   Knee flexion 4+/5 4/5   Knee extension 4/5 4+/5   Ankle dorsiflexion 5/5 5/5   Ankle plantarflexion 5/5 5/5   Ankle inversion 5/5 5/5   Ankle eversion 5/5 5/5      SLR neural tension: (-) bilaterally     Quadruped: protraction/retraction of shoulders - weakness evident via shakiness bilaterally, 30"    Prone on forearms and knees -> toes: 30" each    Prone on hands and toes: 30"     CMS Impairment/Limitation/Restriction for FOTO Lumbar Survey     Therapist reviewed FOTO scores for Alexandru Najera on 2/25/22  FOTO documents entered into [employer] - see Media section.     Limitation Score: 56%  Predicted Goal: 48%     Category: Mobility     Treatment   Charges based on 1-1 tx:   therapeutic exercises for 29 minutes:   R/L SL open book stretch 10x10"   Ball squeezes 5" hold 30x  Hooklying BKFO YTB 30x  Seated thoracic ext with OP, 10"x10  Standing repeated extension, 10"X10  Standing ball roll out (blue ball on mat, modified tandem stand) 10" x 10  Standing hip ABD R/L LE, hands pushing into the ball   Supine with blue exercise ball:   LTR on blue ball 5" hold ea x3'    Heels on blue ball, knees to chest <-> LEs extending for core activation, x 30   Bridging LEs on blue ball, 5" hold x 30   Dying bugs iso with blue ball x 30   Prone on elbows, 10"x10  (initiate) quadruped protraction/retraction   (initiate) planks      manual therapy techniques for 00 minutes:   R/L lumbar rotation +SB JM in SL, Gr III/IV   Seated thoracic side bending, rotation and extension JMs R/L Gr III/IV J M    neuromuscular re-education for 15 minutes:   March hesitation with #10 40ft x2  Seated on blue SB, wand flexion lift OH w/TrA 2x10  Anti rotations with TrA 3x10 blue band  Shld pulldowns with TrA and " "marching, blue band 3x10  Table pushups on BOSU at EOM with abdominal bracing 3x10  Planks at wall 3x30"     therapeutic activities for 8 minutes:   +dead lift, #10, 3x10    Patient Education and Home Exercises     Home Exercises Provided and Patient Education Provided     Education provided:   - TrA activation     Written Home Exercises Provided: Patient instructed to cont prior HEP. Exercises were reviewed and Alexandru was able to demonstrate them prior to the end of the session.  Alexandru demonstrated good  understanding of the education provided. See EMR under Patient Instructions for exercises provided during therapy sessions    ASSESSMENT     Patient presents with improved activity and transitional tolerance since onset of PHYSICAL THERAPY POC. Patient presents with improved lumbar ACTIVE RANGE OF MOTION and hip strength in addition to being able to complete planks but he exhibits weakness in quadruped with pronation/supination. Dead lift implemented today with cuing required for proper mechanics.     Alexandru is making fair progress towards meeting set goals.   Pt prognosis is Good.     Pt will continue to benefit from skilled outpatient physical therapy to address the deficits listed in the problem list box on initial evaluation, provide pt/family education and to maximize pt's level of independence in the home and community environment.     Pt's spiritual, cultural and educational needs considered and pt agreeable to plan of care and goals.     Anticipated barriers to physical therapy: none    GOALS:  Short Term Goals (4 Weeks):  1. Patient will be compliant with home exercise program to supplement therapy in promoting functional mobility. (progressing, not met, 3/2/22)    2. Patient will perform deadlift with good control to demonstrate improved core strength. (progressing, not met, 3/2/22)    3. Patient will report no pain during thoracolumbar active range of motion to promote functional mobility.  (progressing, " not met, 3/2/22)    4. Patient will improve impaired lower extremity hip manual muscle tests  to >/=4/5 to improve strength for functional tasks. (progressing, nearly met, 3/2/22)       Long Term Goals (6 Weeks):   1. Patient will improve FOTO score to </= 48% limited to decrease perceived limitation with maintaining/changing body position. (ongoing, not met, 3/2/22)    2. Patient will perform dying bug exercise with good control to demonstrate improved core strength.  (progressing, nearly met, 3/2/22)    3. Patient will improve impaired lower extremity hip manual muscle tests to >/=4+/5 to improve strength for functional tasks.  (progressing, not met, 3/2/22)    4. Patient will tolerate sitting for 30 minutes with no increase in low back pain to return to PLOF.  (progressing, not met, 3/2/22)        PLAN     Continue with PHYSICAL THERAPY addressing spinal mobility, core strengthening     Updated Certification Period: 3/2/2022 to 05/02/2022  Recommended Treatment Plan: 2 times per week for 3 weeks: Cervical/Lumbar Traction, Electrical Stimulation, Gait Training, Manual Therapy, Moist Heat/ Ice, Neuromuscular Re-ed, Patient Education, Self Care, Therapeutic Activities and Therapeutic Exercise      Mindy Gill, PT

## 2022-03-03 NOTE — PLAN OF CARE
Outpatient Therapy Updated Plan of Care     Name: Alexandru Najera  Clinic Number: [id number]    Therapy Diagnosis:   Encounter Diagnoses   Name Primary?    Lumbar radiculopathy Yes    Abdominal weakness      Physician: Александр Quintanilla*    Visit Date: 3/2/2022    Physician Orders: Evaluate and Treat  Medical Diagnosis from Referral: Radiculopathy, lumbar region [M54.16]     Evaluation Date: 1/19/2022  Authorization Period Expiration:3/4/22  Plan of Care Expiration: 5/2/22  Visit # / Visits authorized: 10/20    Time In: 10:08am  Time Out: 11:15am  Total Billable Time: 67 minutes    SUBJECTIVE     States his MD told him his abdominal issues could be related to the Long Thoracic Nerve and maybe contributing to his weakness as well. Return to MD March 23rd.     States that he is waiting for MRI results and neuro appointment.     Occupation: Currently unemployed; Would like to find a work from home job in the near future  Prior Level of Function: No limitations  Current Level of Function: Pt only able to tolerate sitting for 30 minutes 2/2 abdominal pain     Pain:  Current: 2/10    Location: umbilical region   Description: Sharp     Pts goals: Pt would like to return to sitting with no increase in abdominal pain.    HOME EXERCISE PROGRAM: did open book stretches this morning before PHYSICAL THERAPY session   Response to previous treatment: back hurts sometimes but stomach is feeling the same   Function: able to stand for a few hours yesterday with less limitation due to pain at stomach, limitation in bending forward to reach feet      OBJECTIVE   3/2/2022:  Posture: Forward head, rounded shoulders. Pt very slender/underweight.  Palpation: TTP at naval - no pulsing noted upon palpation     Lumbar Active range of motion    Flexion 51   Extension 11   Right side bending 10   Left side bending 10   Right rotation R<L   Left rotation R<L      Lower extremity manual muscle tests  Right Left   Hip flexion 4+/5 4/5   Hip  "extension 4/5 4-/5   Hip abduction 4+/5 4/5   Hip adduction 4/5 4+/5   Hip internal rotation 4/5 4+/5   Hip external rotation 4/5 4/5   Knee flexion 4+/5 4/5   Knee extension 4/5 4+/5   Ankle dorsiflexion 5/5 5/5   Ankle plantarflexion 5/5 5/5   Ankle inversion 5/5 5/5   Ankle eversion 5/5 5/5      SLR neural tension: (-) bilaterally     Quadruped: protraction/retraction of shoulders - weakness evident via shakiness bilaterally, 30"    Prone on forearms and knees -> toes: 30" each    Prone on hands and toes: 30"     CMS Impairment/Limitation/Restriction for FOTO Lumbar Survey     Therapist reviewed FOTO scores for Alexandru Najera on 2/25/22  FOTO documents entered into [employer] - see Media section.     Limitation Score: 56%  Predicted Goal: 48%     Category: Mobility     Treatment   Charges based on 1-1 tx:   therapeutic exercises for 52 minutes:   Prone on elbows, 10"x10  R/L SL open book stretch 10x10"   Ball squeezes 5" hold 30x  Hooklying BKFO YTB 30x  Seated thoracic ext with OP, 10"x10  Standing repeated extension, 10"X10  Standing ball roll out (blue ball on mat, modified tandem stand) 10" x 10  Standing hip ABD R/L LE, hands pushing into the ball   Update to PHYSICAL THERAPY POC     Supine with blue exercise ball:   LTR on blue ball 5" hold ea x3'    Heels on blue ball, knees to chest <-> LEs extending for core activation, x 30   Bridging LEs on blue ball, 5" hold x 30   Dying bugs iso with blue ball x 30     manual therapy techniques for 00 minutes:   R/L lumbar rotation +SB JM in SL, Gr III/IV   Seated thoracic side bending, rotation and extension JMs R/L Gr III/IV J M    neuromuscular re-education for 15 minutes:   March hesitation with #10 40ft x2  Seated on blue SB, wand flexion lift OH w/TrA 2x10  Anti rotations with TrA 3x10 GTB  Shld pulldowns with TrA and marching, GTB 3x10  Table pushups on BOSU at EOM with abdominal bracing 3x10  Planks at wall 3x30"     therapeutic activities for minutes:   (initiate) " dead lift     Patient Education and Home Exercises     Home Exercises Provided and Patient Education Provided     Education provided:   - TrA activation     Written Home Exercises Provided: Patient instructed to cont prior HEP. Exercises were reviewed and Alexandru was able to demonstrate them prior to the end of the session.  Alexandru demonstrated good  understanding of the education provided. See EMR under Patient Instructions for exercises provided during therapy sessions    ASSESSMENT     Patient presents with improved activity and transitional tolerance since onset of PHYSICAL THERAPY POC. Patient presents with improved lumbar ACTIVE RANGE OF MOTION and hip strength in addition to being able to complete planks today but he exhibits weakness in quadruped with pronation/supination . He continued with deep core/abdominal strengthening with improved endurance and slightly decreaed symptoms/pain noted.     Alexandru is making fair progress towards meeting set goals.   Pt prognosis is Good.     Pt will continue to benefit from skilled outpatient physical therapy to address the deficits listed in the problem list box on initial evaluation, provide pt/family education and to maximize pt's level of independence in the home and community environment.     Pt's spiritual, cultural and educational needs considered and pt agreeable to plan of care and goals.     Anticipated barriers to physical therapy: none    GOALS:  Short Term Goals (4 Weeks):  1. Patient will be compliant with home exercise program to supplement therapy in promoting functional mobility. (progressing, not met, 3/2/22)    2. Patient will perform deadlift with good control to demonstrate improved core strength. (progressing, not met, 3/2/22)    3. Patient will report no pain during thoracolumbar active range of motion to promote functional mobility.  (progressing, not met, 3/2/22)    4. Patient will improve impaired lower extremity hip manual muscle tests  to >/=4/5  to improve strength for functional tasks. (progressing, nearly met, 3/2/22)       Long Term Goals (6 Weeks):   1. Patient will improve FOTO score to </= 48% limited to decrease perceived limitation with maintaining/changing body position. (ongoing, not met, 3/2/22)    2. Patient will perform dying bug exercise with good control to demonstrate improved core strength.  (progressing, nearly met, 3/2/22)    3. Patient will improve impaired lower extremity hip manual muscle tests to >/=4+/5 to improve strength for functional tasks.  (progressing, not met, 3/2/22)    4. Patient will tolerate sitting for 30 minutes with no increase in low back pain to return to PLOF.  (progressing, not met, 3/2/22)      Previous Short Term Goals Status:   Progressing   New Short Term Goals Status:   Continue per POC  Long Term Goal Status:   continue per initial plan of care.  Reasons for Recertification of Therapy:   Update PHYSICAL THERAPY POC    PLAN     Continue with PHYSICAL THERAPY addressing spinal mobility, core strengthening     Updated Certification Period: 3/2/2022 to 05/02/2022  Recommended Treatment Plan: 2 times per week for 3 weeks: Cervical/Lumbar Traction, Electrical Stimulation, Gait Training, Manual Therapy, Moist Heat/ Ice, Neuromuscular Re-ed, Patient Education, Self Care, Therapeutic Activities and Therapeutic Exercise    I CERTIFY THE NEED FOR THESE SERVICES FURNISHED UNDER THIS PLAN OF TREATMENT AND WHILE UNDER MY CARE    Physician's comments:        Physician's Signature: ___________________________________________________      Mindy Gill, PT

## 2022-03-04 ENCOUNTER — CLINICAL SUPPORT (OUTPATIENT)
Dept: REHABILITATION | Facility: OTHER | Age: 37
End: 2022-03-04
Payer: MEDICAID

## 2022-03-04 DIAGNOSIS — M54.16 LUMBAR RADICULOPATHY: Primary | ICD-10-CM

## 2022-03-04 DIAGNOSIS — R19.8 ABDOMINAL WEAKNESS: ICD-10-CM

## 2022-03-04 PROCEDURE — 97110 THERAPEUTIC EXERCISES: CPT | Mod: PN

## 2022-03-11 ENCOUNTER — CLINICAL SUPPORT (OUTPATIENT)
Dept: REHABILITATION | Facility: OTHER | Age: 37
End: 2022-03-11
Payer: MEDICAID

## 2022-03-11 DIAGNOSIS — M54.16 LUMBAR RADICULOPATHY: Primary | ICD-10-CM

## 2022-03-11 DIAGNOSIS — R19.8 ABDOMINAL WEAKNESS: ICD-10-CM

## 2022-03-11 PROCEDURE — 97110 THERAPEUTIC EXERCISES: CPT | Mod: PN

## 2022-03-11 NOTE — PROGRESS NOTES
Outpatient Therapy Treatment Note     Name: Alexandru Najera  Clinic Number: [id number]    Therapy Diagnosis:   Encounter Diagnoses   Name Primary?    Lumbar radiculopathy Yes    Abdominal weakness      Physician: Александр Quintanilla    Visit Date: 3/11/2022    Physician Orders: Evaluate and Treat  Medical Diagnosis from Referral: Radiculopathy, lumbar region [M54.16]     Evaluation Date: 1/19/2022  Authorization Period Expiration:3/4/22  Plan of Care Expiration: 5/2/22  Visit # / Visits authorized: 11/20    Time In: 0900am  Time Out:1000am  Total Billable Time: 60 minutes    SUBJECTIVE     Discharge patient today. Patient has partially/fully met their short and long term goals. PT educated pt on progression of home exercise program and pt demonstrated understanding. All questions/concerns were answered/addressed by PT.       States that he is waiting for MRI results and neuro appointment.     Occupation: Currently unemployed; Would like to find a work from home job in the near future  Prior Level of Function: No limitations  Current Level of Function: Pt only able to tolerate sitting for 30 minutes 2/2 abdominal pain     Pain:  Current: 2/10    Location: umbilical region   Description: Sharp     Pts goals: Pt would like to return to sitting with no increase in abdominal pain.    HOME EXERCISE PROGRAM: did open book stretches this morning before PHYSICAL THERAPY session   Response to previous treatment: felt sharp pain at center of stomach when getting out of his car after last visit and states that it usually lasts several hours but it only lasted for about an hour this time.   Function: able to stand for a few hours with less limitation due to pain at stomach, limitation in bending forward to reach feet      OBJECTIVE   3/2/2022:  Posture: Forward head, rounded shoulders. Pt very slender/underweight.  Palpation: TTP at naval - no pulsing noted upon palpation     Lumbar Active range of motion    Flexion 51  "  Extension 11   Right side bending 10   Left side bending 10   Right rotation R<L   Left rotation R<L      Lower extremity manual muscle tests  Right Left   Hip flexion 4+/5 4/5   Hip extension 4/5 4-/5   Hip abduction 4+/5 4/5   Hip adduction 4/5 4+/5   Hip internal rotation 4/5 4+/5   Hip external rotation 4/5 4/5   Knee flexion 4+/5 4/5   Knee extension 4/5 4+/5   Ankle dorsiflexion 5/5 5/5   Ankle plantarflexion 5/5 5/5   Ankle inversion 5/5 5/5   Ankle eversion 5/5 5/5      SLR neural tension: (-) bilaterally     Quadruped: protraction/retraction of shoulders - weakness evident via shakiness bilaterally, 30"    Prone on forearms and knees -> toes: 30" each    Prone on hands and toes: 30"     CMS Impairment/Limitation/Restriction for FOTO Lumbar Survey     Therapist reviewed FOTO scores for Alexandru Najera on 2/25/22  FOTO documents entered into [employer] - see Media section.     Limitation Score: 56%  Predicted Goal: 48%     Category: Mobility     Treatment   Charges based on 1-1 tx:   therapeutic exercises for 29 minutes:   R/L SL open book stretch 10x10"   Ball squeezes 5" hold 30x  Hooklying BKFO YTB 30x  Seated thoracic ext with OP, 10"x10  Standing repeated extension, 10"X10  Standing ball roll out (blue ball on mat, modified tandem stand) 10" x 10  Standing hip ABD R/L LE, hands pushing into the ball   Supine with blue exercise ball:   LTR on blue ball 5" hold ea x3'    Heels on blue ball, knees to chest <-> LEs extending for core activation, x 30   Bridging LEs on blue ball, 5" hold x 30   Dying bugs iso with blue ball x 30   Prone on elbows, 10"x10  (initiate) quadruped protraction/retraction   (initiate) planks      manual therapy techniques for 00 minutes:   R/L lumbar rotation +SB JM in SL, Gr III/IV   Seated thoracic side bending, rotation and extension JMs R/L Gr III/IV J M    neuromuscular re-education for 15 minutes:   March hesitation with #10 40ft x2  Seated on blue SB, wand flexion lift OH w/TrA " "2x10  Anti rotations with TrA 3x10 blue band  Shld pulldowns with TrA and marching, blue band 3x10  Table pushups on BOSU at EOM with abdominal bracing 3x10  Planks at wall 3x30"     therapeutic activities for 8 minutes:   +dead lift, #10, 3x10    Patient Education and Home Exercises     Home Exercises Provided and Patient Education Provided     Education provided:   - TrA activation     Written Home Exercises Provided: Patient instructed to cont prior HEP. Exercises were reviewed and Alexandru was able to demonstrate them prior to the end of the session.  Alexandru demonstrated good  understanding of the education provided. See EMR under Patient Instructions for exercises provided during therapy sessions    ASSESSMENT     Discharge patient today. Patient has partially/fully met their short and long term goals. PT educated pt on progression of home exercise program and pt demonstrated understanding. All questions/concerns were answered/addressed by PT.       Alexandru is making fair progress towards meeting set goals.   Pt prognosis is Good.     Pt will continue to benefit from skilled outpatient physical therapy to address the deficits listed in the problem list box on initial evaluation, provide pt/family education and to maximize pt's level of independence in the home and community environment.     Pt's spiritual, cultural and educational needs considered and pt agreeable to plan of care and goals.     Anticipated barriers to physical therapy: none    GOALS:  Short Term Goals (4 Weeks):  1. Patient will be compliant with home exercise program to supplement therapy in promoting functional mobility. (progressing, not met, 3/2/22)    2. Patient will perform deadlift with good control to demonstrate improved core strength. (progressing, not met, 3/2/22)    3. Patient will report no pain during thoracolumbar active range of motion to promote functional mobility.  (progressing, not met, 3/2/22)    4. Patient will improve impaired " lower extremity hip manual muscle tests  to >/=4/5 to improve strength for functional tasks. (progressing, nearly met, 3/2/22)       Long Term Goals (6 Weeks):   1. Patient will improve FOTO score to </= 48% limited to decrease perceived limitation with maintaining/changing body position. (ongoing, not met, 3/2/22)    2. Patient will perform dying bug exercise with good control to demonstrate improved core strength.  (progressing, nearly met, 3/2/22)    3. Patient will improve impaired lower extremity hip manual muscle tests to >/=4+/5 to improve strength for functional tasks.  (progressing, not met, 3/2/22)    4. Patient will tolerate sitting for 30 minutes with no increase in low back pain to return to PLOF.  (progressing, not met, 3/2/22)        PLAN   Discharge patient today. Patient has partially/fully met their short and long term goals. PT educated pt on progression of home exercise program and pt demonstrated understanding. All questions/concerns were answered/addressed by PT.      Lupe Hoffman, PT

## 2022-11-08 PROBLEM — M54.16 LUMBAR RADICULOPATHY: Status: RESOLVED | Noted: 2022-01-19 | Resolved: 2022-11-08

## 2022-11-08 PROBLEM — R19.8 ABDOMINAL WEAKNESS: Status: RESOLVED | Noted: 2022-01-19 | Resolved: 2022-11-08

## 2024-11-13 ENCOUNTER — TELEPHONE (OUTPATIENT)
Dept: NEUROLOGY | Facility: CLINIC | Age: 39
End: 2024-11-13
Payer: MEDICAID

## 2024-11-13 NOTE — TELEPHONE ENCOUNTER
----- Message from Med Assistant Domínguez sent at 11/13/2024  4:30 PM CST -----  Regarding: FW: Scheduling Request  Contact: pt @ [phone] (home)  This must have been meant for someone else perhaps  ----- Message -----  From: Desiree Jennings  Sent: 11/11/2024   2:22 PM CST  To: Glenn Mccauley Staff  Subject: Scheduling Request                               Scheduling Request      Appt Type:  NP     Date/Time Preference: first available     Treating Provider: Glenn     Caller Name: Alexandru Najera     Contact Preference: [phone] (home)     Comments/notes: pt is calling to get appt for chronic abdominal pain. Asking for a call back

## 2024-12-26 ENCOUNTER — TELEPHONE (OUTPATIENT)
Dept: NEUROLOGY | Facility: CLINIC | Age: 39
End: 2024-12-26
Payer: MEDICAID

## 2024-12-26 NOTE — TELEPHONE ENCOUNTER
Urgent Request    Referral request received. Forwarded to Nurses Pam and Mare along with EZIO Domínguez.     Upon review of chart, noticed patient was scheduled with Dr. Doll For 8/29/2025 by Eufemia Holden.     Sooner appt request noted since referral came over as Urgent Request for neurological basis for abdominal hyperesthesia or consideration of MS.       Chemo Ahn RN, BSN, BS  [facility] Clinical Care Coordinator  [phone]

## 2024-12-30 ENCOUNTER — OFFICE VISIT (OUTPATIENT)
Dept: NEUROLOGY | Facility: CLINIC | Age: 39
End: 2024-12-30
Payer: MEDICAID

## 2024-12-30 ENCOUNTER — TELEPHONE (OUTPATIENT)
Dept: NEUROLOGY | Facility: CLINIC | Age: 39
End: 2024-12-30
Payer: MEDICAID

## 2024-12-30 ENCOUNTER — LAB VISIT (OUTPATIENT)
Dept: LAB | Facility: HOSPITAL | Age: 39
End: 2024-12-30
Attending: PSYCHIATRY & NEUROLOGY
Payer: MEDICAID

## 2024-12-30 VITALS
HEART RATE: 93 BPM | HEIGHT: 67 IN | WEIGHT: 131.81 LBS | DIASTOLIC BLOOD PRESSURE: 86 MMHG | SYSTOLIC BLOOD PRESSURE: 131 MMHG | BODY MASS INDEX: 20.69 KG/M2

## 2024-12-30 DIAGNOSIS — R10.33 PERIUMBILICAL ABDOMINAL PAIN: ICD-10-CM

## 2024-12-30 DIAGNOSIS — E53.8 B12 DEFICIENCY: ICD-10-CM

## 2024-12-30 DIAGNOSIS — M62.838 SPASM OF ABDOMINAL MUSCLES: ICD-10-CM

## 2024-12-30 DIAGNOSIS — R10.33 PERIUMBILICAL ABDOMINAL PAIN: Primary | ICD-10-CM

## 2024-12-30 LAB
CK SERPL-CCNC: 146 U/L (ref 20–200)
FOLATE SERPL-MCNC: 9.9 NG/ML (ref 4–24)

## 2024-12-30 PROCEDURE — 82607 VITAMIN B-12: CPT | Performed by: PSYCHIATRY & NEUROLOGY

## 2024-12-30 PROCEDURE — 3008F BODY MASS INDEX DOCD: CPT | Mod: CPTII,,, | Performed by: PSYCHIATRY & NEUROLOGY

## 2024-12-30 PROCEDURE — 36415 COLL VENOUS BLD VENIPUNCTURE: CPT | Performed by: PSYCHIATRY & NEUROLOGY

## 2024-12-30 PROCEDURE — 99999 PR PBB SHADOW E&M-EST. PATIENT-LVL III: CPT | Mod: PBBFAC,,, | Performed by: PSYCHIATRY & NEUROLOGY

## 2024-12-30 PROCEDURE — 84207 ASSAY OF VITAMIN B-6: CPT | Performed by: PSYCHIATRY & NEUROLOGY

## 2024-12-30 PROCEDURE — 3079F DIAST BP 80-89 MM HG: CPT | Mod: CPTII,,, | Performed by: PSYCHIATRY & NEUROLOGY

## 2024-12-30 PROCEDURE — 84425 ASSAY OF VITAMIN B-1: CPT | Performed by: PSYCHIATRY & NEUROLOGY

## 2024-12-30 PROCEDURE — 82746 ASSAY OF FOLIC ACID SERUM: CPT | Performed by: PSYCHIATRY & NEUROLOGY

## 2024-12-30 PROCEDURE — 3075F SYST BP GE 130 - 139MM HG: CPT | Mod: CPTII,,, | Performed by: PSYCHIATRY & NEUROLOGY

## 2024-12-30 PROCEDURE — 1159F MED LIST DOCD IN RCRD: CPT | Mod: CPTII,,, | Performed by: PSYCHIATRY & NEUROLOGY

## 2024-12-30 PROCEDURE — 99213 OFFICE O/P EST LOW 20 MIN: CPT | Mod: PBBFAC | Performed by: PSYCHIATRY & NEUROLOGY

## 2024-12-30 PROCEDURE — 99204 OFFICE O/P NEW MOD 45 MIN: CPT | Mod: S$PBB,,, | Performed by: PSYCHIATRY & NEUROLOGY

## 2024-12-30 PROCEDURE — 82550 ASSAY OF CK (CPK): CPT | Performed by: PSYCHIATRY & NEUROLOGY

## 2024-12-30 PROCEDURE — 83921 ORGANIC ACID SINGLE QUANT: CPT | Performed by: PSYCHIATRY & NEUROLOGY

## 2024-12-30 PROCEDURE — G2211 COMPLEX E/M VISIT ADD ON: HCPCS | Mod: S$PBB,,, | Performed by: PSYCHIATRY & NEUROLOGY

## 2024-12-30 RX ORDER — LIDOCAINE 50 MG/G
1 PATCH TOPICAL
COMMUNITY

## 2024-12-30 RX ORDER — TRAMADOL HYDROCHLORIDE 50 MG/1
50 TABLET ORAL EVERY 6 HOURS
COMMUNITY

## 2024-12-30 NOTE — PROGRESS NOTES
[facility] - NEUROLOGY [facility]TH [address] [facility], [place]    Date: 12/30/24  Patient Name: Alexandru Najera   MRN: [id number]   Referring Provider: Self, Aaareferral    Thank you so much Self, Aaareferral for your patient referral to Neuromuscular team at [facility]. We take pride in our care coordination and look forward to your feedback and questions.    Assessment:   39-year-old male with periumbilical pain and tenderness with concern for neuromuscular condition for evaluation and 2nd opinion.  Clinically, his examination is normal and prior workup including laparoscopy has been negative for any abdominal pathology.  I discussed about massage and exercises for abdominal muscle spasm.    I discussed about fall precautions and need for Physiotherapy. I addressed his complaints. I provided information about fall precautions and healthy lifestyle. I would wish him very best for improvement/recovery in his condition.    Future direction based on feedback:    Plan:     Problem List Items Addressed This Visit          GI    Periumbilical abdominal pain - Primary    Relevant Orders    VITAMIN B1    VITAMIN B6    Vitamin B12 Deficiency Panel    FOLATE (Completed)    CK (Completed)     Garrick Elizabeth MD    This evaluation was completed in >55  Minutes over 50% of the time spent on education & counseling. This includes face to face time and non-face to face time preparing to see the patient (eg, review of tests), obtaining and/or reviewing separately obtained history, documenting clinical information in the electronic or other health record, independently interpreting results and communicating results to the patient/family/caregiver, or care coordinator.    Visit today is associated with current or anticipated ongoing medical care related to this patient's single serious condition/complex condition (periumbilical pain with concern for neuromuscular condition). Follow up:  3 months      Patient note was created  using MModal Dictation.  Any errors in syntax or even information may not have been identified and edited on initial review prior to signing this note.    Details provided by:    Patient  Family- Mom    Reason for visit:  Periumbilical pain.      HISTORY OF PRESENT ILLNESS     History of Present Illness  The patient presents for evaluation of abdominal pain and hyperesthesia for past 4 years.. He is accompanied by his mother.    The patient was apparently normal before 4 years later he developed the pain during lifting heavy boxes while shifting to his new apartment. The pain was sudden onset, constant nature, stabbing character, primarily around umbilicus, rated 7/10 in intensity with no radiation. He has been experiencing persistent abdominal pain, which has rendered him unable to work since March 2020. The pain is described as sharp and chronic, with a hernia identified as a potential cause. He reports that the pain is exacerbated by prolonged periods of lying down and relieved by standing. He has not sought relief through massage therapy due to the associated discomfort. Initially, he experienced increased urinary frequency, but this symptom has since resolved. He also notes an unusual bulge in his abdomen, which was not present prior to the onset of his symptoms. He has two brothers who are in good health. He is not currently sexually active but has had multiple female partners in the past. He reports no history of sexually transmitted diseases or exposure to infected individuals. His medical history includes umbilical hernia surgery and exploratory abdominal surgery. He tried various pain meds like gabapentin, duloxetine, amitriptyline and mirtazapine but without any significant improvement. The pain started affecting his sleep, tramadol was started. He was diagnosed with Umbilical Hernia in 2023 and underwent surgery, still his pain persists. Underwent pain block, no improvement.     Supplemental  "Information  He had severe acne on his back and took Accutane for it. He also reports knee pain, which he attributes to prolonged standing.    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 Non smoker and Non alcoholic. Used Recreational drugs in the past- marijuana, LSD, MDMA x 15 yrs back. Currently not using any drugs    FAMILY HISTORY  He has two brothers who are in good health.    Pertinent work up based on chart review for current condition:  KEVIN- negative  SSA/SSB- normal  B12 level in 246  Vitamin D level- 18.5  TSH- 3.42  CBC- normal    12-  Normal pre-and postcontrast MRI lumbar spine. Normal pre-and postcontrast MRI thoracic spine.    Review of Systems:  12 system review of systems is negative except for the symptoms mentioned in HPI.     PHYSICAL EXAMINATION     Vitals:    12/30/24 1223   BP: 131/86   BP Location: Right arm   Patient Position: Sitting   Pulse: 93   Weight: 59.8 kg (131 lb 13.4 oz)   Height: 5' 7" (1.702 m)       Body mass index is 20.65 kg/m².     GENERAL/CONSTITUTIONAL/SYSTEMIC:    -Well appearing; well nourished    Head: Atraumatic, normocephalic  HEENT: PERRLA, EOMI, Oral mucosa moist   Neck: Supple, trachea midline  Pulmonary: CTAB, no increased work of breathing, no rhonchi or wheezing  Abdominal: Soft, moderately tender in epigastric, periumbilical and suprapubic region  Extremities: Warm, well-perfused, no significant edema  Psychiatric: Normal mood & affect; behavior normal & appropriate  Skin: No jaundice, rashes    HIGHER INTEGRATIVE FUNCTIONS:   -Attention & concentration: Normal   -Orientation: Oriented to person, place & time  -Memory: Normal  -Language: Normal   -Fund of Knowledge: Normal     CRANIAL NERVES:   -CN 2: Visual fields full  -CN 2,3: PERRL  -CN 3,4,6: EOMI  -CN 5: Facial sensation intact bilaterally  -CN 7: Facial strength/movement intact bilaterally  -CN 8: Hearing normal bilaterally  -CN 9,10: Palate elevates symmetrically  -CN 11: " Normal shoulder shrug and head turn  -CN 12: Tongue protrudes midline     MOTOR:   -Tone: normal in upper and lower extremities  -UE/LE motor: 5/5 throughout, no pronator drift    -abdominal muscle with normal strength on head flexion and leg raise     SENSATION:   -Intact bilaterally to Vibration and pin prick    REFLEXES:   -2/4 upper and lower extremities bilaterally with relatively brisk knee reflexes  -Flexor plantar reflex bilaterally  -abdominal reflexes intact    COORDINATION:   -FNF normal bilaterally    GAIT:   -Normal casual gait. Able to stand on heels and toes without difficulty.    Scheduled Follow-up :  Future Appointments   Date Time Provider Department Center   8/29/2025 10:40 AM Gaudencio Doll MD [facility] NE[facility] [place]       After Visit Medication List :     Medication List            Accurate as of December 30, 2024 12:44 PM. If you have any questions, ask your nurse or doctor.                CONTINUE taking these medications      LIDOcaine 5 %  Commonly known as: LIDODERM     sulfamethoxazole-trimethoprim 800-160mg 800-160 mg Tab  Commonly known as: BACTRIM DS  Take 1 tablet by mouth 2 (two) times daily.     traMADoL 50 mg tablet  Commonly known as: ULTRAM              Signing Physician:      Garrick Elizabeth MD  , [employer] [employer] [facility] [employer]  Neuromuscular Medicine Section. [facility].   [address] [facility],  [facility] [place]. [address]th [place] [address].    This note was generated with the assistance of ambient listening technology. Verbal consent was obtained by the patient and accompanying visitor(s) for the recording of patient appointment to facilitate this note. I attest to having reviewed and edited the generated note for accuracy, though some syntax or spelling errors may persist. Please contact the author of this note for any clarification.

## 2024-12-30 NOTE — PATIENT INSTRUCTIONS
Abdominal massage and exercise in morning.  Walk and exercise    In case of any question, plz contact through [facility].

## 2024-12-31 PROBLEM — M62.838 SPASM OF ABDOMINAL MUSCLES: Status: ACTIVE | Noted: 2024-12-31

## 2024-12-31 LAB — VIT B12 SERPL-MCNC: 331 NG/L (ref 180–914)

## 2025-01-03 ENCOUNTER — PATIENT MESSAGE (OUTPATIENT)
Dept: NEUROLOGY | Facility: CLINIC | Age: 40
End: 2025-01-03
Payer: MEDICAID

## 2025-01-03 LAB — METHYLMALONATE SERPL-SCNC: 0.13 NMOL/ML

## 2025-01-03 RX ORDER — MECOBALAMIN 1000 MCG
1000 TABLET,CHEWABLE ORAL DAILY
Qty: 90 TABLET | Refills: 1 | Status: SHIPPED | OUTPATIENT
Start: 2025-01-03

## 2025-02-13 ENCOUNTER — PROCEDURE VISIT (OUTPATIENT)
Facility: CLINIC | Age: 40
End: 2025-02-13
Payer: MEDICAID

## 2025-02-13 DIAGNOSIS — M62.838 SPASM OF ABDOMINAL MUSCLES: ICD-10-CM

## 2025-02-13 PROCEDURE — 95869 NDL EMG THRC PARASPINAL MUSC: CPT | Mod: PBBFAC | Performed by: PSYCHIATRY & NEUROLOGY

## 2025-02-13 PROCEDURE — 95910 NRV CNDJ TEST 7-8 STUDIES: CPT | Mod: PBBFAC | Performed by: PSYCHIATRY & NEUROLOGY

## 2025-02-13 NOTE — PROCEDURES
Department of Neurology  Phone No: [phone], Fax: [phone]    Neurography & Electromyography Report    Full Name: Alexandru Najera Gender: Male  Patient ID: [id number] YOB: 1985      Visit Date: 2/13/2025 1:36 PM  Age: 39 Years  Examining Physician: Garrick Elizabeth MD  Height: 5 feet 7 inch  Weight: 131 lbs    Alexandru Najera [id number] 2/13/2025 1:36 PM     Reason for Referral:    Periumbilical pain with concern for neuromuscular condition.      Surgical procedures:    Laparoscopic abdominal surgery    Alexandru Najera [id number] 2/13/2025 1:36 PM       History and Examination:    39-year-old male with a periumbilical abdominal pain and spasm.  On examination, abdominal muscle with normal strength on head forward flexion and leg raise.  Normal sensation and superficial abdominal reflexes.      Technical Difficulties:    None.      Interpretation:     Normal study.    Garrick Elizabeth MD, FRCPE, FCPS, CHCQM  Neuromuscular Consultant  [facility]    Alexandru Najera [id number] 2/13/2025 1:36 PM                  Sensory NCS      Nerve / Sites Rec. Site Segments Onset Lat Peak Lat Onset Jeevan Temp. Amp Distance      ms ms m/s °C µV mm   R Median - Dig II (Antidromic)      Wrist Index Wrist - Index 2.86 3.96 48.9 32.5 72.8 140      Ref.  Ref. <=3.30 <=4.00   >=17.0    R Ulnar - Dig V (Antidromic)      Wrist Dig V Wrist - Dig V 2.60 3.59 46.1 33 42.1 120      Ref.  Ref. <=3.10 <=4.00   >=14.0    R Radial - Superficial (Antidromic)      Forearm Wrist Forearm - Wrist 1.77 2.45 56.5 33.4 43.8 100      Ref.  Ref. <=2.20 <=2.80   >=7.0    R Sural - (Antidromic)      Calf Ankle Calf - Ankle 2.76 3.39 50.7 32.6 17.9 140      Ref.  Ref. <=3.60 <=4.50   >=4.0        Motor NCS      Nerve / Sites Muscle Segments Latency Ref. Velocity Ref. Amplitude Ref. Temp. Dur. Distance      ms ms m/s m/s mV mV °C ms mm   R Median - APB      Wrist APB Wrist - APB 3.94 <=4.60   7.9 >=5.9 33.5 6.8 80      Elbow APB Elbow - Wrist 8.42   58 >=49 7.8  33.5 6.9 260   R Ulnar - ADM      Wrist ADM Wrist - ADM 2.92 <=3.70   13.2 >=7.9 33.4 7.4 80      B.Elbow ADM B.Elbow - Wrist 7.06  55 >=52 13.2  33.4 7.0 230      A.Elbow ADM A.Elbow - B.Elbow 8.81  51 >=43 13.2  33.4 7.1 90     A.Elbow - Wrist       33.4     R Peroneal - EDB      Ankle EDB Ankle - EDB 3.25 <=6.50   8.1 >=2.6 32.6 5.3 80      B. Fib Head EDB B. Fib Head - Ankle 10.52  47 >=37 6.8  32.2 5.8 340      A. Fib Head EDB A. Fib Head - B. Fib Head 12.46  52 >=42 6.5  32 5.9 100   R Tibial - AH      Ankle AH Ankle - AH 3.90 <=6.10   19.1 >=5.3 31.7 6.2 80       F  Wave      Nerve F Latency Ref. M Latency F - M Lat    ms ms ms ms   R Median - APB 28.9 <=30.9 4.5 24.4   R Ulnar - ADM 28.2 <=30.2 3.4 24.8   R Peroneal - EDB 51.5 <=59.7 3.8 47.8   R Tibial - AH 50.9 <=60.0 4.5 46.4       EMG Summary Table     Spontaneous Recruitment MUAP   Muscle Nerve Roots IA Fib PSW Fasc Other Pattern Amp Dur. PPP   R. Rectus abdominis  T7-T12 N None None None N N N N N   L. Rectus abdominis  T7-T12 N None None None N N N N N

## 2025-05-05 DIAGNOSIS — F51.04 PSYCHOPHYSIOLOGIC INSOMNIA: Primary | ICD-10-CM

## 2025-08-05 ENCOUNTER — PATIENT MESSAGE (OUTPATIENT)
Dept: SLEEP MEDICINE | Facility: CLINIC | Age: 40
End: 2025-08-05

## 2025-08-05 ENCOUNTER — OFFICE VISIT (OUTPATIENT)
Dept: SLEEP MEDICINE | Facility: CLINIC | Age: 40
End: 2025-08-05
Payer: MEDICAID

## 2025-08-05 VITALS
HEART RATE: 85 BPM | SYSTOLIC BLOOD PRESSURE: 128 MMHG | HEIGHT: 67 IN | BODY MASS INDEX: 20.07 KG/M2 | DIASTOLIC BLOOD PRESSURE: 82 MMHG | WEIGHT: 127.88 LBS

## 2025-08-05 DIAGNOSIS — F51.04 PSYCHOPHYSIOLOGIC INSOMNIA: ICD-10-CM

## 2025-08-05 PROCEDURE — 1159F MED LIST DOCD IN RCRD: CPT | Mod: CPTII,,, | Performed by: NURSE PRACTITIONER

## 2025-08-05 PROCEDURE — 99999 PR PBB SHADOW E&M-EST. PATIENT-LVL II: CPT | Mod: PBBFAC,,, | Performed by: NURSE PRACTITIONER

## 2025-08-05 PROCEDURE — 99212 OFFICE O/P EST SF 10 MIN: CPT | Mod: PBBFAC | Performed by: NURSE PRACTITIONER

## 2025-08-05 PROCEDURE — 3079F DIAST BP 80-89 MM HG: CPT | Mod: CPTII,,, | Performed by: NURSE PRACTITIONER

## 2025-08-05 PROCEDURE — 3008F BODY MASS INDEX DOCD: CPT | Mod: CPTII,,, | Performed by: NURSE PRACTITIONER

## 2025-08-05 PROCEDURE — 3074F SYST BP LT 130 MM HG: CPT | Mod: CPTII,,, | Performed by: NURSE PRACTITIONER

## 2025-08-05 PROCEDURE — 99214 OFFICE O/P EST MOD 30 MIN: CPT | Mod: S$PBB,,, | Performed by: NURSE PRACTITIONER

## 2025-08-05 NOTE — PROGRESS NOTES
"Referred by Dr. Quintanilla    CHIEF COMPLAINT: wants EEG    HISTORY OF PRESENT ILLNESS: He has never had a sleep study. Denies snoring or ever having witnessed apneic pauses. Main concern if persistent abdominal pain x 5 yrs, waking up from sleep. He has to lie on his back to suppress pain, which only lasts about 30min then pain returns. He walks or stands mostly throughout the day. Tramadol 50mg at bedtime allows him to sleep 4-5hr, 6hrs if he is lisa. W/o Tramadol he was getting ~ 2hr. He has had extensive workup and seen several specialists and had surgery and nerve blocks and tried numerous medications which have not helped.     FAMILY HISTORY: No known sleep disorders.   SOCIAL HISTORY: single, former  laid off pandemic 2020 then had injury  /82 Comment: standing  Pulse 85   Ht 5' 7" (1.702 m)   Wt 58 kg (127 lb 13.9 oz)   BMI 20.03 kg/m²   Modified mallampati III    ASSESSMENT:   Abdominal pain/spasms  Chronic pain    PLAN:  Not likely to have sleep apnea, would obtain PSG if had symptoms or high suspicion of sleep disordered breathing.   Will send message to Neuro about ordering wake drowsy EEG or EMU  Continue to see provider Tramadol qhs and psychologist back as planned after testing completed  Could offer sleep aide see if helps reduce his wakefulness/arousals to help obtain more consolidated sleep.   Asked him to send me list of medications he has tried      Addendum:  Past meds:  mirtazapine (REMERON) 15 MG tablet, linaCLOtide (LINZESS) 290 mcg Cap, topiramate, nortripyline, Amitriptyline 25mg,  Duloxetine, Vitamin B-12, Cyanocobalamin 500 mg, Calcitriol 0.5 mg, Baclofen 10 MG, Gabapentin 300 mg,  cyclobenzaprine 5 mg, meloxicam 15 mg, prednisone 50 mg, amitriptyline 25 mg, dicyclomine 20 mg, pantoprazole 40 mg tablet,  mg capsule.  "

## 2025-08-06 DIAGNOSIS — G47.00 INSOMNIA, UNSPECIFIED TYPE: Primary | ICD-10-CM

## 2025-08-06 RX ORDER — ESZOPICLONE 2 MG/1
2 TABLET, FILM COATED ORAL NIGHTLY
Qty: 30 TABLET | Refills: 2 | Status: SHIPPED | OUTPATIENT
Start: 2025-08-06 | End: 2025-09-07

## 2025-08-21 ENCOUNTER — HOSPITAL ENCOUNTER (OUTPATIENT)
Dept: NEUROLOGY | Facility: CLINIC | Age: 40
Discharge: HOME | End: 2025-08-21
Payer: MEDICAID

## 2025-08-21 DIAGNOSIS — M62.838 SPASM OF ABDOMINAL MUSCLES: Primary | ICD-10-CM

## 2025-08-21 PROCEDURE — 95816 EEG AWAKE AND DROWSY: CPT | Mod: PBBFAC | Performed by: PSYCHIATRY & NEUROLOGY

## 2025-08-25 ENCOUNTER — PATIENT MESSAGE (OUTPATIENT)
Facility: CLINIC | Age: 40
End: 2025-08-25
Payer: MEDICAID

## 2025-09-02 ENCOUNTER — OFFICE VISIT (OUTPATIENT)
Facility: CLINIC | Age: 40
End: 2025-09-02
Payer: MEDICAID

## 2025-09-02 VITALS
BODY MASS INDEX: 20.64 KG/M2 | HEART RATE: 86 BPM | HEIGHT: 66 IN | SYSTOLIC BLOOD PRESSURE: 136 MMHG | DIASTOLIC BLOOD PRESSURE: 83 MMHG | WEIGHT: 128.44 LBS

## 2025-09-02 DIAGNOSIS — G89.0 CENTRAL PAIN SYNDROME: Primary | ICD-10-CM

## 2025-09-02 PROCEDURE — 3008F BODY MASS INDEX DOCD: CPT | Mod: CPTII,,, | Performed by: STUDENT IN AN ORGANIZED HEALTH CARE EDUCATION/TRAINING PROGRAM

## 2025-09-02 PROCEDURE — 3079F DIAST BP 80-89 MM HG: CPT | Mod: CPTII,,, | Performed by: STUDENT IN AN ORGANIZED HEALTH CARE EDUCATION/TRAINING PROGRAM

## 2025-09-02 PROCEDURE — 99213 OFFICE O/P EST LOW 20 MIN: CPT | Mod: PBBFAC,PN | Performed by: STUDENT IN AN ORGANIZED HEALTH CARE EDUCATION/TRAINING PROGRAM

## 2025-09-02 PROCEDURE — 99999 PR PBB SHADOW E&M-EST. PATIENT-LVL III: CPT | Mod: PBBFAC,,, | Performed by: STUDENT IN AN ORGANIZED HEALTH CARE EDUCATION/TRAINING PROGRAM

## 2025-09-02 PROCEDURE — 3075F SYST BP GE 130 - 139MM HG: CPT | Mod: CPTII,,, | Performed by: STUDENT IN AN ORGANIZED HEALTH CARE EDUCATION/TRAINING PROGRAM

## 2025-09-02 PROCEDURE — 99215 OFFICE O/P EST HI 40 MIN: CPT | Mod: S$PBB,,, | Performed by: STUDENT IN AN ORGANIZED HEALTH CARE EDUCATION/TRAINING PROGRAM

## 2025-09-02 RX ORDER — TAMSULOSIN HYDROCHLORIDE 0.4 MG/1
1 CAPSULE ORAL
COMMUNITY
Start: 2025-08-23

## 2025-09-02 RX ORDER — ERGOCALCIFEROL 1.25 MG/1
50000 CAPSULE ORAL
COMMUNITY

## 2025-09-02 RX ORDER — LAMOTRIGINE 25 MG/1
TABLET ORAL
Qty: 444 TABLET | Refills: 0 | Status: SHIPPED | OUTPATIENT
Start: 2025-09-02 | End: 2026-01-12

## 2025-09-04 DIAGNOSIS — G89.0 CENTRAL PAIN SYNDROME: Primary | ICD-10-CM
